# Patient Record
Sex: FEMALE | Race: WHITE | Employment: UNEMPLOYED | URBAN - METROPOLITAN AREA
[De-identification: names, ages, dates, MRNs, and addresses within clinical notes are randomized per-mention and may not be internally consistent; named-entity substitution may affect disease eponyms.]

---

## 2017-07-23 PROBLEM — Z80.49 FAMILY HISTORY OF CERVICAL CANCER: Status: ACTIVE | Noted: 2017-07-23

## 2018-04-30 ENCOUNTER — OFFICE VISIT (OUTPATIENT)
Dept: FAMILY MEDICINE CLINIC | Age: 58
End: 2018-04-30
Payer: MEDICAID

## 2018-04-30 VITALS
OXYGEN SATURATION: 98 % | SYSTOLIC BLOOD PRESSURE: 110 MMHG | TEMPERATURE: 97.6 F | WEIGHT: 239 LBS | DIASTOLIC BLOOD PRESSURE: 62 MMHG | BODY MASS INDEX: 53.76 KG/M2 | HEART RATE: 62 BPM | HEIGHT: 56 IN

## 2018-04-30 DIAGNOSIS — Z00.00 HEALTH CARE MAINTENANCE: ICD-10-CM

## 2018-04-30 DIAGNOSIS — E66.01 MORBID OBESITY WITH BMI OF 50.0-59.9, ADULT (HCC): Primary | ICD-10-CM

## 2018-04-30 DIAGNOSIS — B37.31 VAGINAL YEAST INFECTION: ICD-10-CM

## 2018-04-30 DIAGNOSIS — E03.9 ACQUIRED HYPOTHYROIDISM: ICD-10-CM

## 2018-04-30 DIAGNOSIS — I10 ESSENTIAL HYPERTENSION: ICD-10-CM

## 2018-04-30 DIAGNOSIS — E78.2 MIXED HYPERLIPIDEMIA: ICD-10-CM

## 2018-04-30 DIAGNOSIS — Z12.11 COLON CANCER SCREENING: ICD-10-CM

## 2018-04-30 DIAGNOSIS — E55.9 VITAMIN D DEFICIENCY: ICD-10-CM

## 2018-04-30 LAB — HBA1C MFR BLD: 5.1 %

## 2018-04-30 PROCEDURE — G8427 DOCREV CUR MEDS BY ELIG CLIN: HCPCS | Performed by: FAMILY MEDICINE

## 2018-04-30 PROCEDURE — 1036F TOBACCO NON-USER: CPT | Performed by: FAMILY MEDICINE

## 2018-04-30 PROCEDURE — G8417 CALC BMI ABV UP PARAM F/U: HCPCS | Performed by: FAMILY MEDICINE

## 2018-04-30 PROCEDURE — 3014F SCREEN MAMMO DOC REV: CPT | Performed by: FAMILY MEDICINE

## 2018-04-30 PROCEDURE — 83036 HEMOGLOBIN GLYCOSYLATED A1C: CPT | Performed by: FAMILY MEDICINE

## 2018-04-30 PROCEDURE — 3017F COLORECTAL CA SCREEN DOC REV: CPT | Performed by: FAMILY MEDICINE

## 2018-04-30 PROCEDURE — 99214 OFFICE O/P EST MOD 30 MIN: CPT | Performed by: FAMILY MEDICINE

## 2018-04-30 RX ORDER — SIMVASTATIN 40 MG
TABLET ORAL
Qty: 30 TABLET | Refills: 5
Start: 2018-04-30 | End: 2018-07-20 | Stop reason: SDUPTHER

## 2018-04-30 RX ORDER — LEVOTHYROXINE SODIUM 0.07 MG/1
TABLET ORAL
Qty: 30 TABLET | Refills: 5
Start: 2018-04-30 | End: 2018-07-20 | Stop reason: SDUPTHER

## 2018-04-30 RX ORDER — ERGOCALCIFEROL 1.25 MG/1
50000 CAPSULE ORAL
Qty: 24 CAPSULE | Refills: 1
Start: 2018-04-30 | End: 2018-07-20 | Stop reason: SDUPTHER

## 2018-04-30 ASSESSMENT — ENCOUNTER SYMPTOMS
HEARTBURN: 0
BACK PAIN: 1
BLURRED VISION: 0
DOUBLE VISION: 0
VOMITING: 0
WHEEZING: 0
ORTHOPNEA: 0
BLOOD IN STOOL: 0
DIARRHEA: 0
ABDOMINAL PAIN: 0
COUGH: 0
SORE THROAT: 0
SHORTNESS OF BREATH: 0
SPUTUM PRODUCTION: 0
NAUSEA: 0
CONSTIPATION: 0

## 2018-05-30 PROBLEM — Z00.00 HEALTH CARE MAINTENANCE: Status: RESOLVED | Noted: 2018-04-30 | Resolved: 2018-05-30

## 2018-07-20 ASSESSMENT — ENCOUNTER SYMPTOMS
CONSTIPATION: 0
BLURRED VISION: 0
BACK PAIN: 1
WHEEZING: 0
VOMITING: 0
DIARRHEA: 0
BLOOD IN STOOL: 0
COUGH: 0
SORE THROAT: 0
HEARTBURN: 0
ABDOMINAL PAIN: 0
NAUSEA: 0
ORTHOPNEA: 0
SPUTUM PRODUCTION: 0
DOUBLE VISION: 0
SHORTNESS OF BREATH: 0

## 2018-07-21 NOTE — PROGRESS NOTES
Kwame Olvera is a 56 y.o. female who presents today for check up and medication refills. Her chief complaint is confusion with her medications    She is requesting a 90 day supply for all of her medications. She has medications delivered to her from [facility] in [facility]. Her medications include Abilify and bupropion (prescribed by Morris Koyanagi, ANNA @[facility] for major depression); metoprolol tartrate 25 mg BID for HTN; levothyroxine 75 mcg/day for hypothyroidism; simvastatin 40 mg/day for hyperlipidemia; and Vitamin D 49282 IU/week for Vitamin D deficiency. She is followed by [facility] for \"female problem\"      Morbid Obesity:  She continues to lose weight. She reports that her appetite is markedly decreased and she is not eating as much as she used to. She also reports that she drinks far less soda pop that she had      Health Maintenance:  Kwame Olvera is due for screenings for cervical and colon cancer, HIV and Hepatitis C; she is also due for shingles and Tetanus vaccinations. Agreeable to colon cancer screening via FIT/FOBT. [address]:  Patient's past medical, surgical, social and/or family history reviewed, updated in chart, and are non-contributory (unless otherwise stated). Medications and allergies also reviewed and updated in chart. Review of Systems  Review of Systems   Constitutional: Negative for malaise/fatigue and weight loss. Morbid obesity; weight loss is occurring   HENT: Negative for congestion, ear pain and sore throat. Eyes: Negative for blurred vision and double vision. Respiratory: Negative for cough, sputum production, shortness of breath and wheezing. Cardiovascular: Negative for chest pain, palpitations, orthopnea and leg swelling. Gastrointestinal: Negative for abdominal pain, blood in stool, constipation, diarrhea, heartburn, nausea and vomiting. Genitourinary: Positive for frequency.  Negative for dysuria, hematuria and Twice a Week    Bronchospasm  -     albuterol sulfate HFA (VENTOLIN HFA) 108 (90 Base) MCG/ACT inhaler; INHALE 2 PUFFS EVERY 6 HOURS AS NEEDED FOR WHEEZING. Weight loss: While she reports less pop and less sugar, she still consumes. She has lost nearly 100#  -     XR CHEST STANDARD (2 VW); Future    Health care maintenance:  Due for the following:  -     PHOEBE DIGITAL SCREEN W CAD BILATERAL; Future    Colon cancer screening:  Patient strongly encouraged to complete her FIT kit. New one given to her    Breast cancer screening  -     PHOEBE DIGITAL SCREEN W CAD BILATERAL; Future    Other orders  -     Cancel: ARIPiprazole (ABILIFY) 5 MG tablet; Take 1 tablet by mouth daily        Call or go to ED immediately if symptoms worsen or persist.      Follow Up:  Return for F/U 3 months for check up., or sooner if necessary. Educational materials and/or home exercises printed for patient's review and were included in patient instructions on his/her After Visit Summary and given to patient at the end of visit. Counseled regarding above diagnosis, including possible risks and complications,  especially if left uncontrolled. Counseled regarding the possible side effects, risks, benefits and alternatives to treatment; patient and/or guardian verbalizes understanding, agrees, feels comfortable with and wishes to proceed with above treatment plan. Advised patient to call with any new medication issues, and read all Rx info from pharmacy to assure aware of all possible risks and side effects of medication before taking. Reviewed age and gender appropriate health screening exams and vaccinations. Advised patient regarding importance of keeping up with recommended health maintenance and to schedule as soon as possible if overdue, as this is important in assessing for undiagnosed pathology, especially cancer, as well as protecting against potentially harmful/life threatening disease.       Patient and/or guardian

## 2018-07-30 ENCOUNTER — HOSPITAL ENCOUNTER (OUTPATIENT)
Age: 58
Discharge: HOME | End: 2018-08-01
Payer: MEDICAID

## 2018-07-30 ENCOUNTER — OFFICE VISIT (OUTPATIENT)
Dept: FAMILY MEDICINE CLINIC | Age: 58
End: 2018-07-30
Payer: MEDICAID

## 2018-07-30 VITALS
OXYGEN SATURATION: 96 % | BODY MASS INDEX: 53.2 KG/M2 | DIASTOLIC BLOOD PRESSURE: 64 MMHG | SYSTOLIC BLOOD PRESSURE: 118 MMHG | HEIGHT: 56 IN | TEMPERATURE: 97.6 F | HEART RATE: 60 BPM | WEIGHT: 236.5 LBS

## 2018-07-30 DIAGNOSIS — E03.9 ACQUIRED HYPOTHYROIDISM: ICD-10-CM

## 2018-07-30 DIAGNOSIS — J98.01 BRONCHOSPASM: ICD-10-CM

## 2018-07-30 DIAGNOSIS — Z12.39 BREAST CANCER SCREENING: ICD-10-CM

## 2018-07-30 DIAGNOSIS — E55.9 VITAMIN D DEFICIENCY: ICD-10-CM

## 2018-07-30 DIAGNOSIS — I10 ESSENTIAL HYPERTENSION: ICD-10-CM

## 2018-07-30 DIAGNOSIS — R63.4 WEIGHT LOSS: ICD-10-CM

## 2018-07-30 DIAGNOSIS — E78.2 MIXED HYPERLIPIDEMIA: ICD-10-CM

## 2018-07-30 DIAGNOSIS — Z00.00 HEALTH CARE MAINTENANCE: ICD-10-CM

## 2018-07-30 DIAGNOSIS — I10 ESSENTIAL HYPERTENSION: Primary | ICD-10-CM

## 2018-07-30 DIAGNOSIS — Z12.11 COLON CANCER SCREENING: ICD-10-CM

## 2018-07-30 LAB
ALBUMIN SERPL-MCNC: 4 G/DL (ref 3.5–5.2)
ALP BLD-CCNC: 63 U/L (ref 35–104)
ALT SERPL-CCNC: 15 U/L (ref 0–32)
ANION GAP SERPL CALCULATED.3IONS-SCNC: 14 MMOL/L (ref 7–16)
AST SERPL-CCNC: 19 U/L (ref 0–31)
BASOPHILS ABSOLUTE: 0.02 E9/L (ref 0–0.2)
BASOPHILS RELATIVE PERCENT: 0.3 % (ref 0–2)
BILIRUB SERPL-MCNC: 1 MG/DL (ref 0–1.2)
BUN BLDV-MCNC: 15 MG/DL (ref 6–20)
CALCIUM SERPL-MCNC: 9.7 MG/DL (ref 8.6–10.2)
CHLORIDE BLD-SCNC: 101 MMOL/L (ref 98–107)
CHOLESTEROL, TOTAL: 170 MG/DL (ref 0–199)
CO2: 25 MMOL/L (ref 22–29)
CREAT SERPL-MCNC: 1.5 MG/DL (ref 0.5–1)
EOSINOPHILS ABSOLUTE: 0.06 E9/L (ref 0.05–0.5)
EOSINOPHILS RELATIVE PERCENT: 0.8 % (ref 0–6)
GFR AFRICAN AMERICAN: 43
GFR NON-AFRICAN AMERICAN: 36 ML/MIN/1.73
GLUCOSE BLD-MCNC: 95 MG/DL (ref 74–109)
HCT VFR BLD CALC: 47.3 % (ref 34–48)
HDLC SERPL-MCNC: 46 MG/DL
HEMOGLOBIN: 15 G/DL (ref 11.5–15.5)
IMMATURE GRANULOCYTES #: 0.02 E9/L
IMMATURE GRANULOCYTES %: 0.3 % (ref 0–5)
LDL CHOLESTEROL CALCULATED: 90 MG/DL (ref 0–99)
LYMPHOCYTES ABSOLUTE: 1.97 E9/L (ref 1.5–4)
LYMPHOCYTES RELATIVE PERCENT: 25.9 % (ref 20–42)
MAGNESIUM: 2.2 MG/DL (ref 1.6–2.6)
MCH RBC QN AUTO: 29.9 PG (ref 26–35)
MCHC RBC AUTO-ENTMCNC: 31.7 % (ref 32–34.5)
MCV RBC AUTO: 94.2 FL (ref 80–99.9)
MONOCYTES ABSOLUTE: 0.55 E9/L (ref 0.1–0.95)
MONOCYTES RELATIVE PERCENT: 7.2 % (ref 2–12)
NEUTROPHILS ABSOLUTE: 5 E9/L (ref 1.8–7.3)
NEUTROPHILS RELATIVE PERCENT: 65.5 % (ref 43–80)
PDW BLD-RTO: 13.2 FL (ref 11.5–15)
PLATELET # BLD: 216 E9/L (ref 130–450)
PMV BLD AUTO: 11.2 FL (ref 7–12)
POTASSIUM SERPL-SCNC: 4.6 MMOL/L (ref 3.5–5)
RBC # BLD: 5.02 E12/L (ref 3.5–5.5)
SODIUM BLD-SCNC: 140 MMOL/L (ref 132–146)
T4 TOTAL: 7.8 MCG/DL (ref 4.5–11.7)
TOTAL PROTEIN: 7.4 G/DL (ref 6.4–8.3)
TRIGL SERPL-MCNC: 168 MG/DL (ref 0–149)
TSH SERPL DL<=0.05 MIU/L-ACNC: 1.72 UIU/ML (ref 0.27–4.2)
VITAMIN D 25-HYDROXY: 89 NG/ML (ref 30–100)
VLDLC SERPL CALC-MCNC: 34 MG/DL
WBC # BLD: 7.6 E9/L (ref 4.5–11.5)

## 2018-07-30 PROCEDURE — 80061 LIPID PANEL: CPT

## 2018-07-30 PROCEDURE — 3017F COLORECTAL CA SCREEN DOC REV: CPT | Performed by: FAMILY MEDICINE

## 2018-07-30 PROCEDURE — 84436 ASSAY OF TOTAL THYROXINE: CPT

## 2018-07-30 PROCEDURE — 99214 OFFICE O/P EST MOD 30 MIN: CPT | Performed by: FAMILY MEDICINE

## 2018-07-30 PROCEDURE — 82306 VITAMIN D 25 HYDROXY: CPT

## 2018-07-30 PROCEDURE — G8427 DOCREV CUR MEDS BY ELIG CLIN: HCPCS | Performed by: FAMILY MEDICINE

## 2018-07-30 PROCEDURE — 83735 ASSAY OF MAGNESIUM: CPT

## 2018-07-30 PROCEDURE — 85025 COMPLETE CBC W/AUTO DIFF WBC: CPT

## 2018-07-30 PROCEDURE — 84443 ASSAY THYROID STIM HORMONE: CPT

## 2018-07-30 PROCEDURE — 80053 COMPREHEN METABOLIC PANEL: CPT

## 2018-07-30 PROCEDURE — 3014F SCREEN MAMMO DOC REV: CPT | Performed by: FAMILY MEDICINE

## 2018-07-30 PROCEDURE — 36415 COLL VENOUS BLD VENIPUNCTURE: CPT | Performed by: FAMILY MEDICINE

## 2018-07-30 PROCEDURE — G8417 CALC BMI ABV UP PARAM F/U: HCPCS | Performed by: FAMILY MEDICINE

## 2018-07-30 PROCEDURE — 1036F TOBACCO NON-USER: CPT | Performed by: FAMILY MEDICINE

## 2018-07-30 RX ORDER — SIMVASTATIN 40 MG
TABLET ORAL
Qty: 90 TABLET | Refills: 1 | Status: SHIPPED | OUTPATIENT
Start: 2018-07-30 | End: 2018-10-30 | Stop reason: SDUPTHER

## 2018-07-30 RX ORDER — ERGOCALCIFEROL 1.25 MG/1
50000 CAPSULE ORAL
Qty: 24 CAPSULE | Refills: 1 | Status: SHIPPED | OUTPATIENT
Start: 2018-07-30 | End: 2018-10-30 | Stop reason: DRUGHIGH

## 2018-07-30 RX ORDER — ARIPIPRAZOLE 5 MG/1
5 TABLET ORAL DAILY
Qty: 90 TABLET | Refills: 0 | Status: CANCELLED | OUTPATIENT
Start: 2018-07-30

## 2018-07-30 RX ORDER — ALBUTEROL SULFATE 90 UG/1
AEROSOL, METERED RESPIRATORY (INHALATION)
Qty: 18 G | Refills: 5 | Status: SHIPPED | OUTPATIENT
Start: 2018-07-30 | End: 2018-11-06 | Stop reason: SDUPTHER

## 2018-07-30 RX ORDER — LEVOTHYROXINE SODIUM 0.07 MG/1
TABLET ORAL
Qty: 90 TABLET | Refills: 1 | Status: SHIPPED | OUTPATIENT
Start: 2018-07-30 | End: 2018-10-30 | Stop reason: SDUPTHER

## 2018-10-03 ENCOUNTER — HOSPITAL ENCOUNTER (OUTPATIENT)
Dept: GENERAL RADIOLOGY | Age: 58
Discharge: HOME | End: 2018-10-05
Payer: MEDICAID

## 2018-10-03 DIAGNOSIS — Z12.39 BREAST CANCER SCREENING: ICD-10-CM

## 2018-10-03 DIAGNOSIS — Z00.00 HEALTH CARE MAINTENANCE: ICD-10-CM

## 2018-10-03 PROCEDURE — 77063 BREAST TOMOSYNTHESIS BI: CPT

## 2018-10-30 ENCOUNTER — OFFICE VISIT (OUTPATIENT)
Dept: FAMILY MEDICINE CLINIC | Age: 58
End: 2018-10-30
Payer: MEDICAID

## 2018-10-30 VITALS
WEIGHT: 242 LBS | SYSTOLIC BLOOD PRESSURE: 122 MMHG | BODY MASS INDEX: 54.44 KG/M2 | HEART RATE: 60 BPM | TEMPERATURE: 97.8 F | DIASTOLIC BLOOD PRESSURE: 82 MMHG | HEIGHT: 56 IN | OXYGEN SATURATION: 96 %

## 2018-10-30 DIAGNOSIS — Z12.11 COLON CANCER SCREENING: ICD-10-CM

## 2018-10-30 DIAGNOSIS — E78.2 MIXED HYPERLIPIDEMIA: ICD-10-CM

## 2018-10-30 DIAGNOSIS — E03.9 ACQUIRED HYPOTHYROIDISM: ICD-10-CM

## 2018-10-30 DIAGNOSIS — Z00.00 HEALTH CARE MAINTENANCE: ICD-10-CM

## 2018-10-30 DIAGNOSIS — Z13.31 POSITIVE DEPRESSION SCREENING: ICD-10-CM

## 2018-10-30 DIAGNOSIS — I10 ESSENTIAL HYPERTENSION: ICD-10-CM

## 2018-10-30 DIAGNOSIS — E55.9 VITAMIN D DEFICIENCY: ICD-10-CM

## 2018-10-30 DIAGNOSIS — F32.A DEPRESSION, UNSPECIFIED DEPRESSION TYPE: Primary | ICD-10-CM

## 2018-10-30 DIAGNOSIS — F41.9 ANXIETY: ICD-10-CM

## 2018-10-30 DIAGNOSIS — Z23 NEED FOR INFLUENZA VACCINATION: ICD-10-CM

## 2018-10-30 PROCEDURE — 1036F TOBACCO NON-USER: CPT | Performed by: FAMILY MEDICINE

## 2018-10-30 PROCEDURE — G8417 CALC BMI ABV UP PARAM F/U: HCPCS | Performed by: FAMILY MEDICINE

## 2018-10-30 PROCEDURE — 99214 OFFICE O/P EST MOD 30 MIN: CPT | Performed by: FAMILY MEDICINE

## 2018-10-30 PROCEDURE — 3014F SCREEN MAMMO DOC REV: CPT | Performed by: FAMILY MEDICINE

## 2018-10-30 PROCEDURE — 96160 PT-FOCUSED HLTH RISK ASSMT: CPT | Performed by: FAMILY MEDICINE

## 2018-10-30 PROCEDURE — G8427 DOCREV CUR MEDS BY ELIG CLIN: HCPCS | Performed by: FAMILY MEDICINE

## 2018-10-30 PROCEDURE — 3017F COLORECTAL CA SCREEN DOC REV: CPT | Performed by: FAMILY MEDICINE

## 2018-10-30 PROCEDURE — G8431 POS CLIN DEPRES SCRN F/U DOC: HCPCS | Performed by: FAMILY MEDICINE

## 2018-10-30 PROCEDURE — 90471 IMMUNIZATION ADMIN: CPT | Performed by: FAMILY MEDICINE

## 2018-10-30 PROCEDURE — 90686 IIV4 VACC NO PRSV 0.5 ML IM: CPT | Performed by: FAMILY MEDICINE

## 2018-10-30 PROCEDURE — G8482 FLU IMMUNIZE ORDER/ADMIN: HCPCS | Performed by: FAMILY MEDICINE

## 2018-10-30 RX ORDER — LEVOTHYROXINE SODIUM 0.07 MG/1
TABLET ORAL
Qty: 30 TABLET | Refills: 5 | Status: SHIPPED | OUTPATIENT
Start: 2018-10-30 | End: 2019-03-20 | Stop reason: SDUPTHER

## 2018-10-30 RX ORDER — ERGOCALCIFEROL 1.25 MG/1
50000 CAPSULE ORAL
Qty: 24 CAPSULE | Refills: 1 | Status: CANCELLED | OUTPATIENT
Start: 2018-11-01

## 2018-10-30 RX ORDER — SIMVASTATIN 40 MG
TABLET ORAL
Qty: 30 TABLET | Refills: 5 | Status: SHIPPED | OUTPATIENT
Start: 2018-10-30 | End: 2019-03-20 | Stop reason: SDUPTHER

## 2018-10-30 RX ORDER — ALBUTEROL SULFATE 90 UG/1
AEROSOL, METERED RESPIRATORY (INHALATION)
Qty: 18 G | Refills: 5 | Status: CANCELLED | OUTPATIENT
Start: 2018-10-30

## 2018-10-30 RX ORDER — BUPROPION HYDROCHLORIDE 300 MG/1
TABLET ORAL
Qty: 30 TABLET | Refills: 5
Start: 2018-10-30 | End: 2019-03-20

## 2018-10-30 ASSESSMENT — PATIENT HEALTH QUESTIONNAIRE - PHQ9
2. FEELING DOWN, DEPRESSED OR HOPELESS: 3
10. IF YOU CHECKED OFF ANY PROBLEMS, HOW DIFFICULT HAVE THESE PROBLEMS MADE IT FOR YOU TO DO YOUR WORK, TAKE CARE OF THINGS AT HOME, OR GET ALONG WITH OTHER PEOPLE: 1
5. POOR APPETITE OR OVEREATING: 1
4. FEELING TIRED OR HAVING LITTLE ENERGY: 1
1. LITTLE INTEREST OR PLEASURE IN DOING THINGS: 3
6. FEELING BAD ABOUT YOURSELF - OR THAT YOU ARE A FAILURE OR HAVE LET YOURSELF OR YOUR FAMILY DOWN: 1
7. TROUBLE CONCENTRATING ON THINGS, SUCH AS READING THE NEWSPAPER OR WATCHING TELEVISION: 0
DEPRESSION UNABLE TO ASSESS: FUNCTIONAL CAPACITY MOTIVATION LIMITS ACCURACY
SUM OF ALL RESPONSES TO PHQ QUESTIONS 1-9: 10
8. MOVING OR SPEAKING SO SLOWLY THAT OTHER PEOPLE COULD HAVE NOTICED. OR THE OPPOSITE, BEING SO FIGETY OR RESTLESS THAT YOU HAVE BEEN MOVING AROUND A LOT MORE THAN USUAL: 0
3. TROUBLE FALLING OR STAYING ASLEEP: 1
SUM OF ALL RESPONSES TO PHQ9 QUESTIONS 1 & 2: 6
9. THOUGHTS THAT YOU WOULD BE BETTER OFF DEAD, OR OF HURTING YOURSELF: 0
SUM OF ALL RESPONSES TO PHQ QUESTIONS 1-9: 10

## 2018-10-30 NOTE — PROGRESS NOTES
Shefali Choudhury is a 66 y.o. female who presents today for   Chief Complaint   Patient presents with    Follow-up     She has medications delivered to her from [address] [place] [address] in [facility]. Her medications include Abilify and bupropion (prescribed by Jeremy Mireles, ANNA @[facility] for major depression); metoprolol tartrate 25 mg BID for HTN; levothyroxine 75 mcg/day for hypothyroidism; simvastatin 40 mg/day for hyperlipidemia; and Vitamin D 21020 IU/week for Vitamin D deficiency. She is followed by [facility] for \"female problem\"    Last Vitamin D in 7/18 was 89. Vitamin D 54071 IU is discontinued and Vitamin D 1000 IU daily prescribed. Anxiety/Depression:  Longstanding. On the basis of positive PHQ-9 screening (PHQ-9 Total Score: 10), the following plan was implemented: patient is already under care of psychiatry and psychotherapy. On the basis of positive PHQ-9 screening (PHQ-9 Total Score: 10), the following plan was implemented: continue bupropion  mg/day, aripiprazole 5 mg daily. She reports that, while she does not have a good appetite, she tends to mostly eat sweets    Morbid Obesity:  Weight has stabilized since last visit. She reports that her appetite is markedly decreased and she is not eating as much as she used to. She also reports that she drinks far less soda pop that she had      Health Maintenance:  Shefali Choudhury is due for screenings for flu shot and colon cancer screening. She agrees to Flu shot. She found  Instructions to complete FOBT/FIT kit confusing; this will be reviewed with her by medical staff prior to close of today's office encounter. [address]:  Patient's past medical, surgical, social and/or family history reviewed, updated in chart, and are non-contributory (unless otherwise stated). Medications and allergies also reviewed and updated in chart.       Review of Systems  Review of Systems   Constitutional: Negative for malaise/fatigue and pulses. No murmur heard. Pulmonary/Chest: Effort normal. No stridor. No respiratory distress. She has no wheezes. She has no rales. She exhibits no tenderness. Abdominal: Soft. Bowel sounds are normal. She exhibits no distension and no mass. There is no tenderness. There is no guarding. Obesity   Musculoskeletal: Normal range of motion. She exhibits edema. She exhibits no tenderness. Lymphadenopathy:     She has no cervical adenopathy. Neurological: She is alert and oriented to person, place, and time. Skin: Skin is warm and dry. No rash noted. She is not diaphoretic. No erythema. No pallor. Psychiatric: She has a normal mood and affect. Her behavior is normal. Thought content normal.   Vitals reviewed.       Labs:  Lab Results   Component Value Date     07/30/2018    K 4.6 07/30/2018     07/30/2018    CO2 25 07/30/2018    BUN 15 07/30/2018    CREATININE 1.5 07/30/2018    PROT 7.4 07/30/2018    LABALBU 4.0 07/30/2018    CALCIUM 9.7 07/30/2018    GFRAA 43 07/30/2018    LABGLOM 36 07/30/2018    GLUCOSE 95 07/30/2018    AST 19 07/30/2018    ALT 15 07/30/2018    ALKPHOS 63 07/30/2018    BILITOT 1.0 07/30/2018    TSH 1.720 07/30/2018    CHOL 170 07/30/2018    TRIG 168 07/30/2018    HDL 46 07/30/2018    LDLCALC 90 07/30/2018    LABA1C 5.1 04/30/2018        Lab Results   Component Value Date    CHOL 170 07/30/2018    CHOL 154 09/28/2017    CHOL 139 03/22/2017     Lab Results   Component Value Date    TRIG 168 (H) 07/30/2018    TRIG 146 09/28/2017    TRIG 128 03/22/2017     Lab Results   Component Value Date    HDL 46 07/30/2018    HDL 47 09/28/2017    HDL 48 03/22/2017     Lab Results   Component Value Date    LDLCALC 90 07/30/2018    LDLCALC 78 09/28/2017    LDLCALC 65 03/22/2017       Lab Results   Component Value Date    LABA1C 5.1 04/30/2018     Lab Results   Component Value Date    LDLCALC 90 07/30/2018    CREATININE 1.5 (H) 07/30/2018         Assessment / Plan:      Jenifer Gibbs was seen today

## 2018-11-06 DIAGNOSIS — J98.01 BRONCHOSPASM: ICD-10-CM

## 2018-11-07 RX ORDER — ALBUTEROL SULFATE 90 UG/1
AEROSOL, METERED RESPIRATORY (INHALATION)
Qty: 18 G | Refills: 5 | Status: SHIPPED | OUTPATIENT
Start: 2018-11-07 | End: 2019-09-27 | Stop reason: SDUPTHER

## 2019-01-28 ENCOUNTER — NURSE ONLY (OUTPATIENT)
Dept: FAMILY MEDICINE CLINIC | Age: 59
End: 2019-01-28
Payer: MEDICAID

## 2019-01-28 ENCOUNTER — HOSPITAL ENCOUNTER (OUTPATIENT)
Age: 59
Discharge: HOME | End: 2019-01-30
Payer: MEDICAID

## 2019-01-28 DIAGNOSIS — I10 ESSENTIAL HYPERTENSION: ICD-10-CM

## 2019-01-28 DIAGNOSIS — E78.2 MIXED HYPERLIPIDEMIA: ICD-10-CM

## 2019-01-28 DIAGNOSIS — E03.9 ACQUIRED HYPOTHYROIDISM: ICD-10-CM

## 2019-01-28 DIAGNOSIS — E55.9 VITAMIN D DEFICIENCY: ICD-10-CM

## 2019-01-28 PROCEDURE — 85025 COMPLETE CBC W/AUTO DIFF WBC: CPT

## 2019-01-28 PROCEDURE — 83735 ASSAY OF MAGNESIUM: CPT

## 2019-01-28 PROCEDURE — 84443 ASSAY THYROID STIM HORMONE: CPT

## 2019-01-28 PROCEDURE — 80061 LIPID PANEL: CPT

## 2019-01-28 PROCEDURE — 80053 COMPREHEN METABOLIC PANEL: CPT

## 2019-01-28 PROCEDURE — 36415 COLL VENOUS BLD VENIPUNCTURE: CPT | Performed by: FAMILY MEDICINE

## 2019-01-28 PROCEDURE — 82306 VITAMIN D 25 HYDROXY: CPT

## 2019-01-29 LAB
ALBUMIN SERPL-MCNC: 4.1 G/DL (ref 3.5–5.2)
ALP BLD-CCNC: 62 U/L (ref 35–104)
ALT SERPL-CCNC: 14 U/L (ref 0–32)
ANION GAP SERPL CALCULATED.3IONS-SCNC: 16 MMOL/L (ref 7–16)
AST SERPL-CCNC: 14 U/L (ref 0–31)
BASOPHILS ABSOLUTE: 0.02 E9/L (ref 0–0.2)
BASOPHILS RELATIVE PERCENT: 0.3 % (ref 0–2)
BILIRUB SERPL-MCNC: 1 MG/DL (ref 0–1.2)
BUN BLDV-MCNC: 16 MG/DL (ref 6–20)
CALCIUM SERPL-MCNC: 9.2 MG/DL (ref 8.6–10.2)
CHLORIDE BLD-SCNC: 104 MMOL/L (ref 98–107)
CHOLESTEROL, TOTAL: 218 MG/DL (ref 0–199)
CO2: 22 MMOL/L (ref 22–29)
CREAT SERPL-MCNC: 1.4 MG/DL (ref 0.5–1)
EOSINOPHILS ABSOLUTE: 0.04 E9/L (ref 0.05–0.5)
EOSINOPHILS RELATIVE PERCENT: 0.6 % (ref 0–6)
GFR AFRICAN AMERICAN: 47
GFR NON-AFRICAN AMERICAN: 39 ML/MIN/1.73
GLUCOSE BLD-MCNC: 99 MG/DL (ref 74–99)
HCT VFR BLD CALC: 48.9 % (ref 34–48)
HDLC SERPL-MCNC: 50 MG/DL
HEMOGLOBIN: 15.3 G/DL (ref 11.5–15.5)
IMMATURE GRANULOCYTES #: 0.01 E9/L
IMMATURE GRANULOCYTES %: 0.1 % (ref 0–5)
LDL CHOLESTEROL CALCULATED: 137 MG/DL (ref 0–99)
LYMPHOCYTES ABSOLUTE: 2.01 E9/L (ref 1.5–4)
LYMPHOCYTES RELATIVE PERCENT: 29.1 % (ref 20–42)
MAGNESIUM: 2.2 MG/DL (ref 1.6–2.6)
MCH RBC QN AUTO: 30.3 PG (ref 26–35)
MCHC RBC AUTO-ENTMCNC: 31.3 % (ref 32–34.5)
MCV RBC AUTO: 96.8 FL (ref 80–99.9)
MONOCYTES ABSOLUTE: 0.51 E9/L (ref 0.1–0.95)
MONOCYTES RELATIVE PERCENT: 7.4 % (ref 2–12)
NEUTROPHILS ABSOLUTE: 4.31 E9/L (ref 1.8–7.3)
NEUTROPHILS RELATIVE PERCENT: 62.5 % (ref 43–80)
PDW BLD-RTO: 13.8 FL (ref 11.5–15)
PLATELET # BLD: 221 E9/L (ref 130–450)
PMV BLD AUTO: 11.4 FL (ref 7–12)
POTASSIUM SERPL-SCNC: 4.4 MMOL/L (ref 3.5–5)
RBC # BLD: 5.05 E12/L (ref 3.5–5.5)
SODIUM BLD-SCNC: 142 MMOL/L (ref 132–146)
TOTAL PROTEIN: 7.4 G/DL (ref 6.4–8.3)
TRIGL SERPL-MCNC: 156 MG/DL (ref 0–149)
TSH SERPL DL<=0.05 MIU/L-ACNC: 2.31 UIU/ML (ref 0.27–4.2)
VITAMIN D 25-HYDROXY: 48 NG/ML (ref 30–100)
VLDLC SERPL CALC-MCNC: 31 MG/DL
WBC # BLD: 6.9 E9/L (ref 4.5–11.5)

## 2019-03-20 ENCOUNTER — OFFICE VISIT (OUTPATIENT)
Dept: FAMILY MEDICINE CLINIC | Age: 59
End: 2019-03-20
Payer: MEDICAID

## 2019-03-20 VITALS
WEIGHT: 245.25 LBS | SYSTOLIC BLOOD PRESSURE: 116 MMHG | RESPIRATION RATE: 16 BRPM | OXYGEN SATURATION: 96 % | HEIGHT: 56 IN | DIASTOLIC BLOOD PRESSURE: 70 MMHG | BODY MASS INDEX: 55.17 KG/M2 | HEART RATE: 63 BPM | TEMPERATURE: 97.8 F

## 2019-03-20 DIAGNOSIS — E03.9 ACQUIRED HYPOTHYROIDISM: ICD-10-CM

## 2019-03-20 DIAGNOSIS — F32.A ANXIETY AND DEPRESSION: Primary | ICD-10-CM

## 2019-03-20 DIAGNOSIS — Z12.11 COLON CANCER SCREENING: ICD-10-CM

## 2019-03-20 DIAGNOSIS — I10 ESSENTIAL HYPERTENSION: ICD-10-CM

## 2019-03-20 DIAGNOSIS — E66.01 MORBID OBESITY WITH BMI OF 50.0-59.9, ADULT (HCC): ICD-10-CM

## 2019-03-20 DIAGNOSIS — F41.9 ANXIETY AND DEPRESSION: Primary | ICD-10-CM

## 2019-03-20 DIAGNOSIS — E78.2 MIXED HYPERLIPIDEMIA: ICD-10-CM

## 2019-03-20 DIAGNOSIS — E55.9 VITAMIN D DEFICIENCY: ICD-10-CM

## 2019-03-20 DIAGNOSIS — Z00.00 HEALTH CARE MAINTENANCE: ICD-10-CM

## 2019-03-20 PROCEDURE — G8417 CALC BMI ABV UP PARAM F/U: HCPCS | Performed by: FAMILY MEDICINE

## 2019-03-20 PROCEDURE — 1036F TOBACCO NON-USER: CPT | Performed by: FAMILY MEDICINE

## 2019-03-20 PROCEDURE — 3017F COLORECTAL CA SCREEN DOC REV: CPT | Performed by: FAMILY MEDICINE

## 2019-03-20 PROCEDURE — 96160 PT-FOCUSED HLTH RISK ASSMT: CPT | Performed by: FAMILY MEDICINE

## 2019-03-20 PROCEDURE — 99214 OFFICE O/P EST MOD 30 MIN: CPT | Performed by: FAMILY MEDICINE

## 2019-03-20 PROCEDURE — G8427 DOCREV CUR MEDS BY ELIG CLIN: HCPCS | Performed by: FAMILY MEDICINE

## 2019-03-20 PROCEDURE — G8482 FLU IMMUNIZE ORDER/ADMIN: HCPCS | Performed by: FAMILY MEDICINE

## 2019-03-20 RX ORDER — OXYBUTYNIN CHLORIDE 5 MG/1
5 TABLET, EXTENDED RELEASE ORAL DAILY
COMMUNITY
Start: 2019-03-06

## 2019-03-20 RX ORDER — ARIPIPRAZOLE 5 MG/1
5 TABLET ORAL DAILY
Qty: 30 TABLET | Refills: 5
Start: 2019-03-20

## 2019-03-20 RX ORDER — SIMVASTATIN 40 MG
TABLET ORAL
Qty: 30 TABLET | Refills: 5 | Status: SHIPPED | OUTPATIENT
Start: 2019-03-20 | End: 2019-10-26 | Stop reason: SDUPTHER

## 2019-03-20 RX ORDER — BUPROPION HYDROCHLORIDE 300 MG/1
TABLET ORAL
Qty: 30 TABLET | Refills: 5
Start: 2019-03-20

## 2019-03-20 RX ORDER — LEVOTHYROXINE SODIUM 0.07 MG/1
TABLET ORAL
Qty: 30 TABLET | Refills: 5 | Status: SHIPPED | OUTPATIENT
Start: 2019-03-20 | End: 2019-10-26 | Stop reason: SDUPTHER

## 2019-03-20 ASSESSMENT — PATIENT HEALTH QUESTIONNAIRE - PHQ9
9. THOUGHTS THAT YOU WOULD BE BETTER OFF DEAD, OR OF HURTING YOURSELF: 1
SUM OF ALL RESPONSES TO PHQ QUESTIONS 1-9: 12
4. FEELING TIRED OR HAVING LITTLE ENERGY: 3
8. MOVING OR SPEAKING SO SLOWLY THAT OTHER PEOPLE COULD HAVE NOTICED. OR THE OPPOSITE, BEING SO FIGETY OR RESTLESS THAT YOU HAVE BEEN MOVING AROUND A LOT MORE THAN USUAL: 0
5. POOR APPETITE OR OVEREATING: 1
SUM OF ALL RESPONSES TO PHQ QUESTIONS 1-9: 12
3. TROUBLE FALLING OR STAYING ASLEEP: 1
SUM OF ALL RESPONSES TO PHQ9 QUESTIONS 1 & 2: 5
1. LITTLE INTEREST OR PLEASURE IN DOING THINGS: 3
6. FEELING BAD ABOUT YOURSELF - OR THAT YOU ARE A FAILURE OR HAVE LET YOURSELF OR YOUR FAMILY DOWN: 1
10. IF YOU CHECKED OFF ANY PROBLEMS, HOW DIFFICULT HAVE THESE PROBLEMS MADE IT FOR YOU TO DO YOUR WORK, TAKE CARE OF THINGS AT HOME, OR GET ALONG WITH OTHER PEOPLE: 1
2. FEELING DOWN, DEPRESSED OR HOPELESS: 2

## 2019-09-27 DIAGNOSIS — J98.01 BRONCHOSPASM: ICD-10-CM

## 2019-09-27 RX ORDER — ALBUTEROL SULFATE 90 UG/1
AEROSOL, METERED RESPIRATORY (INHALATION)
Qty: 18 G | Refills: 5 | Status: SHIPPED
Start: 2019-09-27 | End: 2020-03-20

## 2019-11-06 DIAGNOSIS — E55.9 VITAMIN D DEFICIENCY: ICD-10-CM

## 2019-11-07 RX ORDER — MELATONIN
Qty: 30 TABLET | Refills: 11 | Status: SHIPPED
Start: 2019-11-07 | End: 2020-04-20 | Stop reason: SDUPTHER

## 2020-01-31 RX ORDER — LEVOTHYROXINE SODIUM 0.07 MG/1
TABLET ORAL
Qty: 30 TABLET | Refills: 2 | OUTPATIENT
Start: 2020-01-31

## 2020-01-31 RX ORDER — SIMVASTATIN 40 MG
TABLET ORAL
Qty: 30 TABLET | Refills: 2 | OUTPATIENT
Start: 2020-01-31

## 2020-02-03 ENCOUNTER — TELEPHONE (OUTPATIENT)
Dept: FAMILY MEDICINE CLINIC | Age: 60
End: 2020-02-03

## 2020-02-03 NOTE — TELEPHONE ENCOUNTER
I will prescribe a 30 day supply of this prescription. Patient needs to schedule an office visit prior to refills for this or any other medications    She needs to figure out how to get the bedbugs COMPLETELY TREATED  Before she comes to the office; she is correct that she cannot come in as long as she has an active infestation. ..

## 2020-02-03 NOTE — TELEPHONE ENCOUNTER
Renee Chaudhari called and stated she needs refills on all of her medications. She has not been seen since September 2019 she said she can't come in because she still has bedbugs.

## 2020-02-07 NOTE — TELEPHONE ENCOUNTER
Pt needs refill on Metoprolol  Last Appointment:  3/20/2019  No future appointments.    Called LM for pt to schedule appt

## 2020-03-11 ENCOUNTER — OFFICE VISIT (OUTPATIENT)
Dept: FAMILY MEDICINE CLINIC | Age: 60
End: 2020-03-11
Payer: MEDICAID

## 2020-03-11 VITALS
HEART RATE: 74 BPM | TEMPERATURE: 98 F | SYSTOLIC BLOOD PRESSURE: 124 MMHG | OXYGEN SATURATION: 96 % | BODY MASS INDEX: 57.14 KG/M2 | HEIGHT: 56 IN | RESPIRATION RATE: 16 BRPM | WEIGHT: 254 LBS | DIASTOLIC BLOOD PRESSURE: 80 MMHG

## 2020-03-11 PROCEDURE — 1036F TOBACCO NON-USER: CPT | Performed by: NURSE PRACTITIONER

## 2020-03-11 PROCEDURE — G8427 DOCREV CUR MEDS BY ELIG CLIN: HCPCS | Performed by: NURSE PRACTITIONER

## 2020-03-11 PROCEDURE — G8417 CALC BMI ABV UP PARAM F/U: HCPCS | Performed by: NURSE PRACTITIONER

## 2020-03-11 PROCEDURE — 99214 OFFICE O/P EST MOD 30 MIN: CPT | Performed by: NURSE PRACTITIONER

## 2020-03-11 PROCEDURE — G8484 FLU IMMUNIZE NO ADMIN: HCPCS | Performed by: NURSE PRACTITIONER

## 2020-03-11 PROCEDURE — 3017F COLORECTAL CA SCREEN DOC REV: CPT | Performed by: NURSE PRACTITIONER

## 2020-03-11 RX ORDER — SIMVASTATIN 40 MG
40 TABLET ORAL NIGHTLY
Qty: 30 TABLET | Refills: 2 | Status: SHIPPED
Start: 2020-03-11 | End: 2020-04-20 | Stop reason: SDUPTHER

## 2020-03-11 ASSESSMENT — ENCOUNTER SYMPTOMS
VOMITING: 0
WHEEZING: 0
CONSTIPATION: 0
NAUSEA: 0
COLOR CHANGE: 0
SHORTNESS OF BREATH: 0
COUGH: 0
EYE PAIN: 0
CHEST TIGHTNESS: 0
SINUS PAIN: 0
DIARRHEA: 0

## 2020-03-11 NOTE — PROGRESS NOTES
external ear normal.      Left Ear: Tympanic membrane, ear canal and external ear normal.      Nose: Nose normal.      Mouth/Throat:      Mouth: Mucous membranes are moist.      Pharynx: Oropharynx is clear. No oropharyngeal exudate. Eyes:      Extraocular Movements: Extraocular movements intact. Conjunctiva/sclera: Conjunctivae normal.      Pupils: Pupils are equal, round, and reactive to light. Neck:      Musculoskeletal: Normal range of motion and neck supple. Thyroid: No thyromegaly. Vascular: No carotid bruit. Cardiovascular:      Rate and Rhythm: Normal rate and regular rhythm. Pulses: Normal pulses. Heart sounds: Normal heart sounds. No murmur. Pulmonary:      Effort: Pulmonary effort is normal. No respiratory distress. Breath sounds: Normal breath sounds. Abdominal:      General: Bowel sounds are normal.      Palpations: Abdomen is soft. Tenderness: There is no abdominal tenderness. There is no guarding or rebound. Genitourinary:     Vagina: Normal.      Comments: Deferred. Musculoskeletal: Normal range of motion. Lymphadenopathy:      Cervical: No cervical adenopathy. Skin:     General: Skin is warm and dry. Capillary Refill: Capillary refill takes less than 2 seconds. Findings: No bruising, erythema or rash. Neurological:      General: No focal deficit present. Mental Status: She is alert and oriented to person, place, and time. Mental status is at baseline. Cranial Nerves: No cranial nerve deficit. Sensory: No sensory deficit. Motor: No weakness. Coordination: Coordination normal.      Gait: Gait normal.   Psychiatric:         Mood and Affect: Mood normal.         Behavior: Behavior normal.         Thought Content:  Thought content normal.         Judgment: Judgment normal.       Health Maintenance    BP Readings from Last 1 Encounters:   03/11/20 124/80    Recheck if >140/90  Hemoglobin A1C (%)   Date Value

## 2020-03-20 RX ORDER — ALBUTEROL SULFATE 90 UG/1
AEROSOL, METERED RESPIRATORY (INHALATION)
Qty: 18 G | Refills: 5 | Status: SHIPPED
Start: 2020-03-20 | End: 2021-03-08 | Stop reason: SDUPTHER

## 2020-03-25 PROBLEM — N20.0 KIDNEY STONES: Status: RESOLVED | Noted: 2020-03-25 | Resolved: 2020-03-24

## 2020-04-16 RX ORDER — LEVOTHYROXINE SODIUM 0.07 MG/1
TABLET ORAL
Qty: 30 TABLET | Refills: 1 | Status: CANCELLED | OUTPATIENT
Start: 2020-04-16

## 2020-04-16 NOTE — TELEPHONE ENCOUNTER
She needs an appointment.   Either a face to face or a Video appointment (preferably)    I will do a Virtual Visit per telephone call as a last resort

## 2020-04-20 NOTE — PROGRESS NOTES
Lester Garcia is a 64 y.o. female who presents today for     Chief Complaint   Patient presents with    Medication Refill     In need of refills        She has medications delivered to her from [address] [place] [address] in [facility]. Her medications include Abilify and bupropion (prescribed by  NP @[facility] for major depression); metoprolol tartrate 25 mg BID for HTN; levothyroxine 75 mcg/day for hypothyroidism; simvastatin 40 mg/day for hyperlipidemia; and Vitamin D 40836 IU/week for Vitamin D deficiency. She is followed by [facility] for \"female problem\". Issue with UI are improved with oxybutynin. Dr. Marcie Rowland needs to schedule patient for a D & C. She is reticent about moving forward with this procedure, despite the fact that her mother passed away in her late 62s from a \"female cancer\"      Anxiety/Depression:  Longstanding. On the basis of positive PHQ-9 screening (PHQ-9 Total Score: 13), the following plan was implemented: patient is already under care of psychiatry and psychotherapy. On the basis of positive PHQ-9 screening (PHQ-9 Total Score: 13),she is to continue bupropion  mg/day, aripiprazole 5 mg daily. She is followed by [facility] @ [facility]. Her counseling sessions are conducted over the phone as opposed to in office due to active bedbug infestation. She reports that she tends to mostly eat sweets. Weight is stable/decreasing. Morbid Obesity:    Wt Readings from Last 3 Encounters:   04/28/20 247 lb (112 kg)   03/11/20 254 lb (115.2 kg)   03/20/19 245 lb 4 oz (111.2 kg)     BMI Readings from Last 3 Encounters:   04/28/20 55.38 kg/m²   03/11/20 56.95 kg/m²   03/20/19 54.98 kg/m²     She has lost weight because she is not getting out to eat or go to the grocery store due to COVID-19 pandemic. In addition, she does report decreased appetite due to presence of depression    Hypertension:  Patient is here for follow up chronic hypertension.   This is importance of keeping up with recommended health maintenance and to schedule as soon as possible if overdue, as this is important in assessing for undiagnosed pathology, especially cancer, as well as protecting against potentially harmful/life threatening disease. Patient and/or guardian verbalizes understanding and agrees with above counseling, assessment and plan. All questions answered.     Sugey Schmidt MD     Patient will follow-up in 3 month(s) with psychiatrist.

## 2020-04-23 ENCOUNTER — HOSPITAL ENCOUNTER (OUTPATIENT)
Age: 60
Discharge: HOME | End: 2020-04-23
Payer: MEDICAID

## 2020-04-23 LAB
ALBUMIN SERPL-MCNC: 4.4 G/DL (ref 3.5–5.2)
ALP BLD-CCNC: 70 U/L (ref 35–104)
ALT SERPL-CCNC: 11 U/L (ref 0–32)
ANION GAP SERPL CALCULATED.3IONS-SCNC: 16 MMOL/L (ref 7–16)
AST SERPL-CCNC: 14 U/L (ref 0–31)
BASOPHILS ABSOLUTE: 0.04 E9/L (ref 0–0.2)
BASOPHILS RELATIVE PERCENT: 0.6 % (ref 0–2)
BILIRUB SERPL-MCNC: 0.8 MG/DL (ref 0–1.2)
BUN BLDV-MCNC: 20 MG/DL (ref 6–20)
CALCIUM SERPL-MCNC: 10 MG/DL (ref 8.6–10.2)
CHLORIDE BLD-SCNC: 100 MMOL/L (ref 98–107)
CO2: 23 MMOL/L (ref 22–29)
CREAT SERPL-MCNC: 1.5 MG/DL (ref 0.5–1)
EOSINOPHILS ABSOLUTE: 0.07 E9/L (ref 0.05–0.5)
EOSINOPHILS RELATIVE PERCENT: 1.1 % (ref 0–6)
GFR AFRICAN AMERICAN: 43
GFR NON-AFRICAN AMERICAN: 36 ML/MIN/1.73
GLUCOSE BLD-MCNC: 106 MG/DL (ref 74–99)
HCT VFR BLD CALC: 47.1 % (ref 34–48)
HEMOGLOBIN: 15 G/DL (ref 11.5–15.5)
IMMATURE GRANULOCYTES #: 0.02 E9/L
IMMATURE GRANULOCYTES %: 0.3 % (ref 0–5)
LYMPHOCYTES ABSOLUTE: 1.76 E9/L (ref 1.5–4)
LYMPHOCYTES RELATIVE PERCENT: 26.5 % (ref 20–42)
MCH RBC QN AUTO: 30.1 PG (ref 26–35)
MCHC RBC AUTO-ENTMCNC: 31.8 % (ref 32–34.5)
MCV RBC AUTO: 94.4 FL (ref 80–99.9)
MONOCYTES ABSOLUTE: 0.48 E9/L (ref 0.1–0.95)
MONOCYTES RELATIVE PERCENT: 7.2 % (ref 2–12)
NEUTROPHILS ABSOLUTE: 4.28 E9/L (ref 1.8–7.3)
NEUTROPHILS RELATIVE PERCENT: 64.3 % (ref 43–80)
PARATHYROID HORMONE INTACT: 28 PG/ML (ref 15–65)
PDW BLD-RTO: 13.4 FL (ref 11.5–15)
PHOSPHORUS: 3.5 MG/DL (ref 2.5–4.5)
PLATELET # BLD: 205 E9/L (ref 130–450)
PMV BLD AUTO: 10.7 FL (ref 7–12)
POTASSIUM SERPL-SCNC: 4.8 MMOL/L (ref 3.5–5)
RBC # BLD: 4.99 E12/L (ref 3.5–5.5)
SODIUM BLD-SCNC: 139 MMOL/L (ref 132–146)
TOTAL PROTEIN: 7.7 G/DL (ref 6.4–8.3)
WBC # BLD: 6.7 E9/L (ref 4.5–11.5)

## 2020-04-23 PROCEDURE — 83970 ASSAY OF PARATHORMONE: CPT

## 2020-04-23 PROCEDURE — 36415 COLL VENOUS BLD VENIPUNCTURE: CPT

## 2020-04-23 PROCEDURE — 80053 COMPREHEN METABOLIC PANEL: CPT

## 2020-04-23 PROCEDURE — 84100 ASSAY OF PHOSPHORUS: CPT

## 2020-04-23 PROCEDURE — 85025 COMPLETE CBC W/AUTO DIFF WBC: CPT

## 2020-04-28 ENCOUNTER — HOSPITAL ENCOUNTER (OUTPATIENT)
Age: 60
Discharge: HOME | End: 2020-04-30
Payer: MEDICAID

## 2020-04-28 ENCOUNTER — OFFICE VISIT (OUTPATIENT)
Dept: FAMILY MEDICINE CLINIC | Age: 60
End: 2020-04-28
Payer: MEDICAID

## 2020-04-28 VITALS
HEIGHT: 56 IN | RESPIRATION RATE: 24 BRPM | BODY MASS INDEX: 55.56 KG/M2 | SYSTOLIC BLOOD PRESSURE: 116 MMHG | WEIGHT: 247 LBS | OXYGEN SATURATION: 98 % | DIASTOLIC BLOOD PRESSURE: 77 MMHG | TEMPERATURE: 97.7 F | HEART RATE: 73 BPM

## 2020-04-28 PROBLEM — Z76.0 ENCOUNTER FOR MEDICATION REFILL: Status: ACTIVE | Noted: 2020-04-28

## 2020-04-28 LAB
CHOLESTEROL, TOTAL: 216 MG/DL (ref 0–199)
HDLC SERPL-MCNC: 50 MG/DL
LDL CHOLESTEROL CALCULATED: 140 MG/DL (ref 0–99)
T4 TOTAL: 7.6 MCG/DL (ref 4.5–11.7)
TRIGL SERPL-MCNC: 132 MG/DL (ref 0–149)
TSH SERPL DL<=0.05 MIU/L-ACNC: 2.21 UIU/ML (ref 0.27–4.2)
VLDLC SERPL CALC-MCNC: 26 MG/DL

## 2020-04-28 PROCEDURE — 84436 ASSAY OF TOTAL THYROXINE: CPT

## 2020-04-28 PROCEDURE — 84443 ASSAY THYROID STIM HORMONE: CPT

## 2020-04-28 PROCEDURE — 80061 LIPID PANEL: CPT

## 2020-04-28 PROCEDURE — 3017F COLORECTAL CA SCREEN DOC REV: CPT | Performed by: FAMILY MEDICINE

## 2020-04-28 PROCEDURE — G8417 CALC BMI ABV UP PARAM F/U: HCPCS | Performed by: FAMILY MEDICINE

## 2020-04-28 PROCEDURE — G8427 DOCREV CUR MEDS BY ELIG CLIN: HCPCS | Performed by: FAMILY MEDICINE

## 2020-04-28 PROCEDURE — 1036F TOBACCO NON-USER: CPT | Performed by: FAMILY MEDICINE

## 2020-04-28 PROCEDURE — 36415 COLL VENOUS BLD VENIPUNCTURE: CPT

## 2020-04-28 PROCEDURE — 99214 OFFICE O/P EST MOD 30 MIN: CPT | Performed by: FAMILY MEDICINE

## 2020-04-28 RX ORDER — SIMVASTATIN 40 MG
40 TABLET ORAL NIGHTLY
Qty: 30 TABLET | Refills: 11 | Status: SHIPPED
Start: 2020-04-28 | End: 2020-07-24 | Stop reason: SDUPTHER

## 2020-04-28 RX ORDER — LEVOTHYROXINE SODIUM 0.07 MG/1
75 TABLET ORAL DAILY
Qty: 30 TABLET | Refills: 11 | Status: SHIPPED
Start: 2020-04-28 | End: 2020-07-24 | Stop reason: SDUPTHER

## 2020-04-28 ASSESSMENT — PATIENT HEALTH QUESTIONNAIRE - PHQ9
4. FEELING TIRED OR HAVING LITTLE ENERGY: 2
1. LITTLE INTEREST OR PLEASURE IN DOING THINGS: 2
SUM OF ALL RESPONSES TO PHQ QUESTIONS 1-9: 13
10. IF YOU CHECKED OFF ANY PROBLEMS, HOW DIFFICULT HAVE THESE PROBLEMS MADE IT FOR YOU TO DO YOUR WORK, TAKE CARE OF THINGS AT HOME, OR GET ALONG WITH OTHER PEOPLE: 2
6. FEELING BAD ABOUT YOURSELF - OR THAT YOU ARE A FAILURE OR HAVE LET YOURSELF OR YOUR FAMILY DOWN: 2
7. TROUBLE CONCENTRATING ON THINGS, SUCH AS READING THE NEWSPAPER OR WATCHING TELEVISION: 1
3. TROUBLE FALLING OR STAYING ASLEEP: 0
8. MOVING OR SPEAKING SO SLOWLY THAT OTHER PEOPLE COULD HAVE NOTICED. OR THE OPPOSITE, BEING SO FIGETY OR RESTLESS THAT YOU HAVE BEEN MOVING AROUND A LOT MORE THAN USUAL: 1
9. THOUGHTS THAT YOU WOULD BE BETTER OFF DEAD, OR OF HURTING YOURSELF: 1
SUM OF ALL RESPONSES TO PHQ9 QUESTIONS 1 & 2: 4
SUM OF ALL RESPONSES TO PHQ QUESTIONS 1-9: 13
2. FEELING DOWN, DEPRESSED OR HOPELESS: 2
5. POOR APPETITE OR OVEREATING: 2

## 2020-04-28 NOTE — PATIENT INSTRUCTIONS
Patient Education        Bedbugs: Care Instructions  Your Care Instructions    Bedbugs are tiny bugs that feed on blood from animals or people. They have that name because they like to hide in bedding and mattresses. Bedbugs are not known to spread disease to people. But itching from the bites can be so bad that you may scratch hard enough to break the skin. That can lead to infection. The bites can also cause an allergic reaction in some people. The bugs can spread into cracks and crevices in the room and lay their eggs in anything that is in the room, including clothing and furniture. This makes them very hard to kill. Getting rid of bedbugs  The best way to get rid of bedbugs is to call a professional pest control company. They use special pesticides and other equipment to kill the bugs and their eggs. If you decide to buy your own pesticide, check the label. Make sure it says that it is for bedbugs. Be sure to follow the directions carefully. You may have to use the pesticide more than once. Do other cleaning steps such as:  · Vacuum often. Be sure to empty the vacuum after each use. If you use a vacuum bag, seal it and throw it out in an outdoor trash can. If you don't use a vacuum bag, empty the container and clean it with hot, soapy water. · Launder things that might hide bugs. Washing and then drying items in a dryer on a hot setting is adequate to kill bedbugs in clothing or linens. Turn the dryer to the hottest setting that the fabric can handle. · Use mattress, box spring, and pillow (encasement) sacks to trap bed bugs and help get rid of them. Be sure to follow the directions on the package. After your mattress has been cleared of bedbugs, you can buy a special mattress cover that is made to keep bedbugs out of your mattress. Follow-up care is a key part of your treatment and safety. Be sure to make and go to all appointments, and call your doctor if you are having problems.  It's also a good

## 2020-07-07 ENCOUNTER — OFFICE VISIT (OUTPATIENT)
Dept: FAMILY MEDICINE CLINIC | Age: 60
End: 2020-07-07
Payer: MEDICAID

## 2020-07-07 VITALS
HEIGHT: 56 IN | HEART RATE: 68 BPM | BODY MASS INDEX: 56.69 KG/M2 | RESPIRATION RATE: 16 BRPM | WEIGHT: 252 LBS | OXYGEN SATURATION: 98 %

## 2020-07-07 PROBLEM — R46.0 POOR PERSONAL HYGIENE: Status: ACTIVE | Noted: 2020-07-07

## 2020-07-07 PROCEDURE — G8427 DOCREV CUR MEDS BY ELIG CLIN: HCPCS | Performed by: FAMILY MEDICINE

## 2020-07-07 PROCEDURE — 3017F COLORECTAL CA SCREEN DOC REV: CPT | Performed by: FAMILY MEDICINE

## 2020-07-07 PROCEDURE — G8417 CALC BMI ABV UP PARAM F/U: HCPCS | Performed by: FAMILY MEDICINE

## 2020-07-07 PROCEDURE — 99215 OFFICE O/P EST HI 40 MIN: CPT | Performed by: FAMILY MEDICINE

## 2020-07-07 PROCEDURE — 1036F TOBACCO NON-USER: CPT | Performed by: FAMILY MEDICINE

## 2020-07-07 RX ORDER — MELATONIN
COMMUNITY
Start: 2020-05-01 | End: 2020-07-24 | Stop reason: SDUPTHER

## 2020-07-07 ASSESSMENT — ENCOUNTER SYMPTOMS
WHEEZING: 0
BLOOD IN STOOL: 0
ABDOMINAL PAIN: 0
DIARRHEA: 0
BACK PAIN: 0
SORE THROAT: 0
COUGH: 0
CONSTIPATION: 0
NAUSEA: 0
VOMITING: 0
SHORTNESS OF BREATH: 0

## 2020-07-07 ASSESSMENT — PATIENT HEALTH QUESTIONNAIRE - PHQ9
1. LITTLE INTEREST OR PLEASURE IN DOING THINGS: 0
SUM OF ALL RESPONSES TO PHQ QUESTIONS 1-9: 0
SUM OF ALL RESPONSES TO PHQ9 QUESTIONS 1 & 2: 0
SUM OF ALL RESPONSES TO PHQ QUESTIONS 1-9: 0
2. FEELING DOWN, DEPRESSED OR HOPELESS: 0

## 2020-07-07 NOTE — PROGRESS NOTES
Isa Beckman is a 64 y.o. female who presents today for     Chief Complaint   Patient presents with    Other     odor     Rash     concerned with skin        She is accompanied today by her  Kalpana Levin informed PCP that patient, who was previously living independently but in squalid conditions, was relocated to the [facility]. She had to leave all of her belongings behind because everything was bedbug infested. She endorses that patient now has to provide self care, including chores, her laundry, and daily bathing and grooming. She is now eligible for additional services, including meal delivery and home health aide. Despite this change, patient still is malodorous. She has a GYN history; she has urinary incontinence that is not well controlled with oxybutynin and she will not wear undergarment protection. She reports that she showers every day but her perineal care is \"so-so\". She was scheduled for D & C in 3/2020 but procedure was cancelled once COVID pandemic hit    Weight is stable. Denies significant fatigue. Appetite is good and she is eating 3 healthy, well balanced meals/day. Denies constipation or diarrhea. No skin/hair/nail texture changes. She is managed psychiatrically at [address]:  Patient's past medical, surgical, social and/or family history reviewed, updated in chart, and are non-contributory (unless otherwise stated). Medications and allergies also reviewed and updated in chart. Review of Systems  Review of Systems   HENT: Negative for congestion, ear pain and sore throat. Respiratory: Negative for cough, shortness of breath and wheezing. Cardiovascular: Negative for chest pain, palpitations and leg swelling. Gastrointestinal: Negative for abdominal pain, blood in stool, constipation, diarrhea, nausea and vomiting. Genitourinary: Negative for dysuria, frequency, hematuria and urgency.         Complains of itching in perineum and she does admit to scratching the area frequently   Musculoskeletal: Negative for back pain, myalgias and neck pain. Skin: Negative for rash. Neurological: Negative for dizziness, weakness and headaches. Psychiatric/Behavioral: The patient is not nervous/anxious. Physical Exam:    VS:  Pulse 68   Resp 16   Ht 4' 8\" (1.422 m)   Wt 252 lb (114.3 kg)   SpO2 98%   BMI 56.50 kg/m²     LAST WEIGHT:  Wt Readings from Last 3 Encounters:   07/07/20 252 lb (114.3 kg)   04/28/20 247 lb (112 kg)   03/11/20 254 lb (115.2 kg)       BMI Readings from Last 3 Encounters:   07/07/20 56.50 kg/m²   04/28/20 55.38 kg/m²   03/11/20 56.95 kg/m²       Physical Exam  Constitutional:       General: She is not in acute distress. Appearance: She is well-developed. She is not diaphoretic. HENT:      Head: Normocephalic and atraumatic. Right Ear: External ear normal.      Left Ear: External ear normal.      Mouth/Throat:      Pharynx: No oropharyngeal exudate. Eyes:      General: No scleral icterus. Right eye: No discharge. Conjunctiva/sclera: Conjunctivae normal.      Pupils: Pupils are equal, round, and reactive to light. Neck:      Musculoskeletal: Normal range of motion and neck supple. Thyroid: No thyromegaly. Cardiovascular:      Rate and Rhythm: Normal rate and regular rhythm. Heart sounds: Normal heart sounds. No murmur. Pulmonary:      Effort: Pulmonary effort is normal. No respiratory distress. Breath sounds: No stridor. No wheezing or rales. Chest:      Chest wall: No tenderness. Abdominal:      General: Bowel sounds are normal. There is no distension. Palpations: Abdomen is soft. There is no mass. Tenderness: There is no abdominal tenderness. There is no guarding. Genitourinary:     Exam position: Supine. Comments: Perineal are very malodorous.   Multiple excoriations of external vulva and labiae majora consistent with self inflicted itching and scratching    Large fecal streak noted on exam room table paper where she was seated  Musculoskeletal: Normal range of motion. General: No tenderness. Lymphadenopathy:      Cervical: No cervical adenopathy. Skin:     General: Skin is warm and dry. Coloration: Skin is not pale. Findings: No erythema or rash. Comments: Skin of bilateral breasts, axillae, and groin area remarkably clean and dry, free of rashes, lesions or odor   Neurological:      Mental Status: She is alert and oriented to person, place, and time. Psychiatric:         Behavior: Behavior normal.         Thought Content:  Thought content normal.         Labs:  Lab Results   Component Value Date     04/23/2020    K 4.8 04/23/2020     04/23/2020    CO2 23 04/23/2020    BUN 20 04/23/2020    CREATININE 1.5 04/23/2020    PROT 7.7 04/23/2020    LABALBU 4.4 04/23/2020    CALCIUM 10.0 04/23/2020    GFRAA 43 04/23/2020    LABGLOM 36 04/23/2020    GLUCOSE 106 04/23/2020    AST 14 04/23/2020    ALT 11 04/23/2020    ALKPHOS 70 04/23/2020    BILITOT 0.8 04/23/2020    TSH 2.210 04/28/2020    CHOL 216 04/28/2020    TRIG 132 04/28/2020    HDL 50 04/28/2020    LDLCALC 140 04/28/2020    LABA1C 5.1 04/30/2018        Lab Results   Component Value Date    CHOL 216 (H) 04/28/2020    CHOL 218 (H) 01/28/2019    CHOL 170 07/30/2018     Lab Results   Component Value Date    TRIG 132 04/28/2020    TRIG 156 (H) 01/28/2019    TRIG 168 (H) 07/30/2018     Lab Results   Component Value Date    HDL 50 04/28/2020    HDL 50 01/28/2019    HDL 46 07/30/2018     Lab Results   Component Value Date    LDLCALC 140 (H) 04/28/2020    LDLCALC 137 (H) 01/28/2019    LDLCALC 90 07/30/2018       Lab Results   Component Value Date    LABA1C 5.1 04/30/2018     Lab Results   Component Value Date    LDLCALC 140 (H) 04/28/2020    CREATININE 1.5 (H) 04/23/2020           Assessment / Plan:      Caprice Rice was seen today for other and

## 2020-07-24 ENCOUNTER — TELEPHONE (OUTPATIENT)
Dept: FAMILY MEDICINE CLINIC | Age: 60
End: 2020-07-24

## 2020-07-24 RX ORDER — SIMVASTATIN 40 MG
40 TABLET ORAL NIGHTLY
Qty: 30 TABLET | Refills: 11 | Status: SHIPPED
Start: 2020-07-24 | End: 2021-06-13 | Stop reason: SDUPTHER

## 2020-07-24 RX ORDER — LEVOTHYROXINE SODIUM 0.07 MG/1
75 TABLET ORAL DAILY
Qty: 30 TABLET | Refills: 11 | Status: SHIPPED
Start: 2020-07-24 | End: 2021-06-13 | Stop reason: SDUPTHER

## 2020-07-24 RX ORDER — MELATONIN
1000 DAILY
Qty: 30 TABLET | Refills: 11 | Status: SHIPPED
Start: 2020-07-24 | End: 2021-06-13 | Stop reason: SDUPTHER

## 2020-07-24 NOTE — TELEPHONE ENCOUNTER
walked into the office and said they are changing her pharmacy to [facility] in [place] so they can do the pill packs. All meds need to have a new script sent over they can not be transferred from her old pharmacy.

## 2020-07-24 NOTE — TELEPHONE ENCOUNTER
[address] [facility] [address]

## 2020-08-17 ENCOUNTER — TELEPHONE (OUTPATIENT)
Dept: FAMILY MEDICINE CLINIC | Age: 60
End: 2020-08-17

## 2020-08-17 NOTE — TELEPHONE ENCOUNTER
Modesta  for pt called and states insurance does cover for adult diapers and chux pads (liners). Wanting to know if you could order some please.   Please advise

## 2020-09-18 NOTE — TELEPHONE ENCOUNTER
Jen Pimentel [facility], called for refill.       Last seen 4/28/2020  Next appt 9/21/2020  Josue Thompson

## 2020-09-19 RX ORDER — CHOLECALCIFEROL (VITAMIN D3) 125 MCG
500 CAPSULE ORAL DAILY
Qty: 30 TABLET | Refills: 5 | OUTPATIENT
Start: 2020-09-19

## 2020-10-04 ASSESSMENT — ENCOUNTER SYMPTOMS
COUGH: 0
VOMITING: 0
DIARRHEA: 0
NAUSEA: 0
SHORTNESS OF BREATH: 0
SORE THROAT: 0
CONSTIPATION: 0
WHEEZING: 0
ABDOMINAL PAIN: 0
BLOOD IN STOOL: 0
BACK PAIN: 0

## 2020-10-05 NOTE — PROGRESS NOTES
Phylicia Jerez is a 64 y.o. female who presents today for     Chief Complaint   Patient presents with    Hearing Loss     C/o hearing loss. Unsure if symptoms are due to anxiety or she has actual hearing loss. Symptoms started \" quite awhile\" ago. She is concerned that it may be more concentration and memory related. She describes her symptoms that she can't keep up with what is being spoken. She does report that she has been having trouble sleeping in group home environment. She denies depression. She reports anxiety oer upcomig move to independent living. No known FH of Alzhemier's or dementia. [address]:  Patient's past medical, surgical, social and/or family history reviewed, updated in chart, and are non-contributory (unless otherwise stated). Medications and allergies also reviewed and updated in chart. Review of Systems  Review of Systems   HENT: Positive for hearing loss. Negative for congestion, ear pain and sore throat. Respiratory: Negative for cough, shortness of breath and wheezing. Cardiovascular: Negative for chest pain, palpitations and leg swelling. Gastrointestinal: Negative for abdominal pain, blood in stool, constipation, diarrhea, nausea and vomiting. Genitourinary: Negative for dysuria, frequency, hematuria and urgency. Musculoskeletal: Negative for back pain, myalgias and neck pain. Skin: Negative for rash. Neurological: Negative for dizziness, weakness and headaches. Psychiatric/Behavioral: The patient is not nervous/anxious. Physical Exam:    VS:  There were no vitals taken for this visit. LAST WEIGHT:  Wt Readings from Last 3 Encounters:   07/07/20 252 lb (114.3 kg)   04/28/20 247 lb (112 kg)   03/11/20 254 lb (115.2 kg)       BMI Readings from Last 3 Encounters:   07/07/20 56.50 kg/m²   04/28/20 55.38 kg/m²   03/11/20 56.95 kg/m²       Physical Exam  Constitutional:       General: She is not in acute distress.      Appearance: She is well-developed. She is not diaphoretic. HENT:      Head: Normocephalic and atraumatic. Right Ear: External ear normal.      Left Ear: External ear normal.      Mouth/Throat:      Pharynx: No oropharyngeal exudate. Eyes:      General: No scleral icterus. Right eye: No discharge. Conjunctiva/sclera: Conjunctivae normal.      Pupils: Pupils are equal, round, and reactive to light. Neck:      Musculoskeletal: Normal range of motion and neck supple. Thyroid: No thyromegaly. Cardiovascular:      Rate and Rhythm: Normal rate and regular rhythm. Heart sounds: Normal heart sounds. No murmur. Pulmonary:      Effort: Pulmonary effort is normal. No respiratory distress. Breath sounds: No stridor. No wheezing or rales. Chest:      Chest wall: No tenderness. Abdominal:      General: Bowel sounds are normal. There is no distension. Palpations: Abdomen is soft. There is no mass. Tenderness: There is no abdominal tenderness. There is no guarding. Musculoskeletal: Normal range of motion. General: No tenderness. Lymphadenopathy:      Cervical: No cervical adenopathy. Skin:     General: Skin is warm and dry. Coloration: Skin is not pale. Findings: No erythema or rash. Neurological:      Mental Status: She is alert and oriented to person, place, and time. Psychiatric:         Behavior: Behavior normal.         Thought Content: Thought content normal.      Comments: MINI-MENTAL STATUS EXAM ([facility])    What is the Year? Season? Date? Day? Month?   (indicate # correct)        5    Where are we: State? County? Town? Place?  Floor? (indicate # correct)        5    Name 3 objects and have pt repeat.                (indicate # correct)        3    Serial 7's or spell \"world\" backwards.                 (indicate # correct)        5    Recall 3 objects                (indicate # correct)        3    Patient names a pencil & a watch (indicate # correct)        2    Read and obey \"Close your eyes\"                (indicate 1 if correct)     1    Copy intersecting pentagons                (indicate 1 if correct)     1    Write a sentence                (indicate 1 if correct)     1    Repeat \"no ifs, ands, or buts\"                (indicate 1 if correct)     0    Follow 3-stage command                (indicate # done correctly) 3                                            ------------  TOTAL SCORE   (max = 30)                 29      REFERENCE INFORMATION FOR THE CLINICIAN:    \"Low\" score    = 0-23  \"Normal\" score = 24-30         Labs:  Lab Results   Component Value Date     04/23/2020    K 4.8 04/23/2020     04/23/2020    CO2 23 04/23/2020    BUN 20 04/23/2020    CREATININE 1.5 04/23/2020    PROT 7.7 04/23/2020    LABALBU 4.4 04/23/2020    CALCIUM 10.0 04/23/2020    GFRAA 43 04/23/2020    LABGLOM 36 04/23/2020    GLUCOSE 106 04/23/2020    AST 14 04/23/2020    ALT 11 04/23/2020    ALKPHOS 70 04/23/2020    BILITOT 0.8 04/23/2020    TSH 2.210 04/28/2020    CHOL 216 04/28/2020    TRIG 132 04/28/2020    HDL 50 04/28/2020    LDLCALC 140 04/28/2020    LABA1C 5.1 04/30/2018        Lab Results   Component Value Date    CHOL 216 (H) 04/28/2020    CHOL 218 (H) 01/28/2019    CHOL 170 07/30/2018     Lab Results   Component Value Date    TRIG 132 04/28/2020    TRIG 156 (H) 01/28/2019    TRIG 168 (H) 07/30/2018     Lab Results   Component Value Date    HDL 50 04/28/2020    HDL 50 01/28/2019    HDL 46 07/30/2018     Lab Results   Component Value Date    LDLCALC 140 (H) 04/28/2020    LDLCALC 137 (H) 01/28/2019    LDLCALC 90 07/30/2018       Lab Results   Component Value Date    LABA1C 5.1 04/30/2018     Lab Results   Component Value Date    LDLCALC 140 (H) 04/28/2020    CREATININE 1.5 (H) 04/23/2020           Assessment / Plan:      Ever Espinoza was seen today for hearing loss.     Diagnoses and all orders for this visit:    Decreased hearing, unspecified

## 2020-10-06 ENCOUNTER — OFFICE VISIT (OUTPATIENT)
Dept: FAMILY MEDICINE CLINIC | Age: 60
End: 2020-10-06
Payer: MEDICAID

## 2020-10-06 VITALS
SYSTOLIC BLOOD PRESSURE: 128 MMHG | TEMPERATURE: 98.4 F | DIASTOLIC BLOOD PRESSURE: 76 MMHG | HEART RATE: 72 BPM | WEIGHT: 249 LBS | BODY MASS INDEX: 55.82 KG/M2 | OXYGEN SATURATION: 92 %

## 2020-10-06 PROCEDURE — G8427 DOCREV CUR MEDS BY ELIG CLIN: HCPCS | Performed by: FAMILY MEDICINE

## 2020-10-06 PROCEDURE — G8417 CALC BMI ABV UP PARAM F/U: HCPCS | Performed by: FAMILY MEDICINE

## 2020-10-06 PROCEDURE — G8484 FLU IMMUNIZE NO ADMIN: HCPCS | Performed by: FAMILY MEDICINE

## 2020-10-06 PROCEDURE — 99213 OFFICE O/P EST LOW 20 MIN: CPT | Performed by: FAMILY MEDICINE

## 2020-10-06 PROCEDURE — 3017F COLORECTAL CA SCREEN DOC REV: CPT | Performed by: FAMILY MEDICINE

## 2020-10-06 PROCEDURE — 1036F TOBACCO NON-USER: CPT | Performed by: FAMILY MEDICINE

## 2020-10-06 RX ORDER — MELOXICAM 15 MG/1
15 TABLET ORAL DAILY
COMMUNITY
Start: 2020-10-01 | End: 2021-06-15

## 2020-10-12 ENCOUNTER — TELEPHONE (OUTPATIENT)
Dept: FAMILY MEDICINE CLINIC | Age: 60
End: 2020-10-12

## 2020-10-12 RX ORDER — CYANOCOBALAMIN (VITAMIN B-12) 500 MCG
TABLET ORAL
Qty: 30 TABLET | Refills: 11 | Status: SHIPPED
Start: 2020-10-12 | End: 2021-06-15 | Stop reason: SDUPTHER

## 2020-11-10 ENCOUNTER — TELEPHONE (OUTPATIENT)
Dept: FAMILY MEDICINE CLINIC | Age: 60
End: 2020-11-10

## 2020-11-10 NOTE — TELEPHONE ENCOUNTER
Pt. Called need rx faxed to [facility] audiology   [phone]    Please put DX on rx    Last seen 10/6/2020  Next appt 3/8/2021

## 2020-12-20 ASSESSMENT — ENCOUNTER SYMPTOMS
ABDOMINAL PAIN: 0
SORE THROAT: 0
WHEEZING: 0
DIARRHEA: 0
NAUSEA: 0
BLOOD IN STOOL: 0
SHORTNESS OF BREATH: 0
COUGH: 0
VOMITING: 0
BACK PAIN: 0
CONSTIPATION: 0

## 2020-12-20 NOTE — PROGRESS NOTES
Constitutional:       General: She is not in acute distress. Appearance: She is well-developed. She is not diaphoretic. HENT:      Head: Normocephalic and atraumatic. Right Ear: External ear normal. Decreased hearing noted. Left Ear: External ear normal. Decreased hearing noted. Mouth/Throat:      Pharynx: No oropharyngeal exudate. Eyes:      General: No scleral icterus. Right eye: No discharge. Conjunctiva/sclera: Conjunctivae normal.      Pupils: Pupils are equal, round, and reactive to light. Neck:      Musculoskeletal: Normal range of motion and neck supple. Thyroid: No thyromegaly. Cardiovascular:      Rate and Rhythm: Normal rate and regular rhythm. Heart sounds: Normal heart sounds. No murmur. Pulmonary:      Effort: Pulmonary effort is normal. No respiratory distress. Breath sounds: No stridor. No wheezing or rales. Chest:      Chest wall: No tenderness. Abdominal:      General: Bowel sounds are normal. There is no distension. Palpations: Abdomen is soft. There is no mass. Tenderness: There is no abdominal tenderness. There is no guarding. Musculoskeletal: Normal range of motion. General: No tenderness. Lymphadenopathy:      Cervical: No cervical adenopathy. Skin:     General: Skin is warm and dry. Coloration: Skin is not pale. Findings: No erythema or rash. Neurological:      Mental Status: She is alert and oriented to person, place, and time. Psychiatric:         Behavior: Behavior normal.         Thought Content:  Thought content normal.         Labs:  Lab Results   Component Value Date     04/23/2020    K 4.8 04/23/2020     04/23/2020    CO2 23 04/23/2020    BUN 20 04/23/2020    CREATININE 1.5 04/23/2020    PROT 7.7 04/23/2020    LABALBU 4.4 04/23/2020    CALCIUM 10.0 04/23/2020    GFRAA 43 04/23/2020    LABGLOM 36 04/23/2020    GLUCOSE 106 04/23/2020    AST 14 04/23/2020    ALT 11 04/23/2020 alternatives to treatment; patient and/or guardian verbalizes understanding, agrees, feels comfortable with and wishes to proceed with above treatment plan. Advised patient tocall with any new medication issues, and read all Rx info from pharmacy to assureaware of all possible risks and side effects of medication before taking. Reviewed age and gender appropriate health screening exams and vaccinations. Advisedpatient regarding importance of keeping up with recommended health maintenance andto schedule as soon as possible if overdue, as this is important in assessing forundiagnosed pathology, especially cancer, as well as protecting against potentially harmful/life threatening disease. Patient and/or guardian verbalizes understandingand agrees with above counseling, assessment and plan. All questions answered.     Nati Eedn MD

## 2020-12-21 ENCOUNTER — OFFICE VISIT (OUTPATIENT)
Dept: FAMILY MEDICINE CLINIC | Age: 60
End: 2020-12-21
Payer: MEDICAID

## 2020-12-21 VITALS
WEIGHT: 242 LBS | OXYGEN SATURATION: 98 % | HEART RATE: 60 BPM | HEIGHT: 56 IN | TEMPERATURE: 96.6 F | SYSTOLIC BLOOD PRESSURE: 122 MMHG | DIASTOLIC BLOOD PRESSURE: 80 MMHG | BODY MASS INDEX: 54.44 KG/M2

## 2020-12-21 PROCEDURE — G8482 FLU IMMUNIZE ORDER/ADMIN: HCPCS | Performed by: FAMILY MEDICINE

## 2020-12-21 PROCEDURE — G8427 DOCREV CUR MEDS BY ELIG CLIN: HCPCS | Performed by: FAMILY MEDICINE

## 2020-12-21 PROCEDURE — G8417 CALC BMI ABV UP PARAM F/U: HCPCS | Performed by: FAMILY MEDICINE

## 2020-12-21 PROCEDURE — 3017F COLORECTAL CA SCREEN DOC REV: CPT | Performed by: FAMILY MEDICINE

## 2020-12-21 PROCEDURE — 99213 OFFICE O/P EST LOW 20 MIN: CPT | Performed by: FAMILY MEDICINE

## 2020-12-21 PROCEDURE — 90686 IIV4 VACC NO PRSV 0.5 ML IM: CPT | Performed by: FAMILY MEDICINE

## 2020-12-21 PROCEDURE — 1036F TOBACCO NON-USER: CPT | Performed by: FAMILY MEDICINE

## 2020-12-21 PROCEDURE — 90471 IMMUNIZATION ADMIN: CPT | Performed by: FAMILY MEDICINE

## 2020-12-21 SDOH — ECONOMIC STABILITY: TRANSPORTATION INSECURITY
IN THE PAST 12 MONTHS, HAS THE LACK OF TRANSPORTATION KEPT YOU FROM MEDICAL APPOINTMENTS OR FROM GETTING MEDICATIONS?: NOT ASKED

## 2020-12-21 SDOH — ECONOMIC STABILITY: FOOD INSECURITY: WITHIN THE PAST 12 MONTHS, THE FOOD YOU BOUGHT JUST DIDN'T LAST AND YOU DIDN'T HAVE MONEY TO GET MORE.: NEVER TRUE

## 2020-12-21 SDOH — ECONOMIC STABILITY: FOOD INSECURITY: WITHIN THE PAST 12 MONTHS, YOU WORRIED THAT YOUR FOOD WOULD RUN OUT BEFORE YOU GOT MONEY TO BUY MORE.: NEVER TRUE

## 2020-12-21 SDOH — ECONOMIC STABILITY: INCOME INSECURITY: HOW HARD IS IT FOR YOU TO PAY FOR THE VERY BASICS LIKE FOOD, HOUSING, MEDICAL CARE, AND HEATING?: NOT HARD AT ALL

## 2020-12-21 SDOH — ECONOMIC STABILITY: TRANSPORTATION INSECURITY
IN THE PAST 12 MONTHS, HAS LACK OF TRANSPORTATION KEPT YOU FROM MEETINGS, WORK, OR FROM GETTING THINGS NEEDED FOR DAILY LIVING?: NOT ASKED

## 2020-12-21 NOTE — PROGRESS NOTES
Vaccine Information Sheet, \"Influenza - Inactivated\"  given to [employer], or parent/legal guardian of  [employer] and verbalized understanding. Patient responses:    Have you ever had a reaction to a flu vaccine? No  Do you have any current illness? No  Have you ever had Guillian Azusa Syndrome? No  Do you have a serious allergy to any of the follow: Neomycin, Polymyxin, Thimerosal, eggs or egg products? No    Flu vaccine given per order. Please see immunization tab. Risks and benefits explained. Current VIS given.       Immunizations Administered     Name Date Dose Route    Influenza, Quadv, IM, PF (6 mo and older Fluzone, Flulaval, Fluarix, and 3 yrs and older Afluria) 12/21/2020 0.5 mL Intramuscular    Site: Deltoid- Left    Lot: [id number]    NDC: 30886-935-93

## 2020-12-21 NOTE — PATIENT INSTRUCTIONS
Patient Education        Sciatica: Care Instructions  Your Care Instructions     Sciatica (say \"cpc-QL-kr-kuh\") is an irritation of one of the sciatic nerves, which come from the spinal cord in the lower back. The sciatic nerves and their branches extend down through the buttock to the foot. Sciatica can develop when an injured disc in the back irritates or presses against a spinal nerve root. Its main symptom is pain, numbness, or weakness that is often worse in the leg or foot than in the back. Sciatica often will improve and go away with time. Early treatment usually includes medicines and exercises to relieve pain. Follow-up care is a key part of your treatment and safety. Be sure to make and go to all appointments, and call your doctor if you are having problems. It's also a good idea to know your test results and keep a list of the medicines you take. How can you care for yourself at home? · Take pain medicines exactly as directed. ? If the doctor gave you a prescription medicine for pain, take it as prescribed. ? If you are not taking a prescription pain medicine, ask your doctor if you can take an over-the-counter medicine. · Use heat or ice to relieve pain. ? To apply heat, put a warm water bottle, heating pad set on low, or warm cloth on your back. Do not go to sleep with a heating pad on your skin. ? To use ice, put ice or a cold pack on the area for 10 to 20 minutes at a time. Put a thin cloth between the ice and your skin. · Avoid sitting if possible, unless it feels better than standing. · Alternate lying down with short walks. Increase your walking distance as you are able to without making your symptoms worse. · Do not do anything that makes your symptoms worse. When should you call for help? Call 911 anytime you think you may need emergency care. For example, call if:    · You are unable to move a leg at all.    Call your doctor now or seek immediate medical care if:   · You have new or worse symptoms in your legs or buttocks. Symptoms may include:  ? Numbness or tingling. ? Weakness. ? Pain.     · You lose bladder or bowel control. Watch closely for changes in your health, and be sure to contact your doctor if:    · You are not getting better as expected. Where can you learn more? Go to https://chpepiceweb.AsesoriÂ­as Digitales (Digital Advisors). org and sign in to your Eloxx account. Enter [phone] in the [facility] box to learn more about \"Sciatica: Care Instructions. \"     If you do not have an account, please click on the \"Sign Up Now\" link. Current as of: March 2, 2020               Content Version: 12.6  © 2006-2020 [employer], [employer]GL. Care instructions adapted under license by [facility] ([facility]). If you have questions about a medical condition or this instruction, always ask your healthcare professional. [address] any warranty or liability for your use of this information. Patient Education        Sciatica: Exercises  Introduction  Here are some examples of typical rehabilitation exercises for your condition. Start each exercise slowly. Ease off the exercise if you start to have pain. Your doctor or physical therapist will tell you when you can start these exercises and which ones will work best for you. When you are not being active, find a comfortable position for rest. Some people are comfortable on the floor or a medium-firm bed with a small pillow under their head and another under their knees. Some people prefer to lie on their side with a pillow between their knees. Don't stay in one position for too long. Take short walks (10 to 20 minutes) every 2 to 3 hours. Avoid slopes, hills, and stairs until you feel better. Walk only distances you can manage without pain, especially leg pain. How to do the exercises  Back stretches   1. Get down on your hands and knees on the floor. 8. Press your elbows down into the floor to raise your upper back. As you do this, relax your stomach muscles and allow your back to arch without using your back muscles. As your press up, do not let your hips or pelvis come off the floor. 9. Hold for 15 to 30 seconds, then relax. 10. Repeat 2 to 4 times. Alternate arm and leg (bird dog) exercise   Do this exercise slowly. Try to keep your body straight at all times, and do not let one hip drop lower than the other. 1. Start on the floor, on your hands and knees. 2. Tighten your belly muscles. 3. Raise one leg off the floor, and hold it straight out behind you. Be careful not to let your hip drop down, because that will twist your trunk. 4. Hold for about 6 seconds, then lower your leg and switch to the other leg. 5. Repeat 8 to 12 times on each leg. 6. Over time, work up to holding for 10 to 30 seconds each time. 7. If you feel stable and secure with your leg raised, try raising the opposite arm straight out in front of you at the same time. Knee-to-chest exercise   1. Lie on your back with your knees bent and your feet flat on the floor. 2. Bring one knee to your chest, keeping the other foot flat on the floor (or keeping the other leg straight, whichever feels better on your lower back). 3. Keep your lower back pressed to the floor. Hold for at least 15 to 30 seconds. 4. Relax, and lower the knee to the starting position. 5. Repeat with the other leg. Repeat 2 to 4 times with each leg. 6. To get more stretch, put your other leg flat on the floor while pulling your knee to your chest.    Curl-ups   1. Lie on the floor on your back with your knees bent at a 90-degree angle. Your feet should be flat on the floor, about 12 inches from your buttocks. 2. Cross your arms over your chest. If this bothers your neck, try putting your hands behind your neck (not your head), with your elbows spread apart. 3. Slowly tighten your belly muscles and raise your shoulder blades off the floor. 4. Keep your head in line with your body, and do not press your chin to your chest.  5. Hold this position for 1 or 2 seconds, then slowly lower yourself back down to the floor. 6. Repeat 8 to 12 times. Pelvic tilt exercise   1. Lie on your back with your knees bent. 2. \"Brace\" your stomach. This means to tighten your muscles by pulling in and imagining your belly button moving toward your spine. You should feel like your back is pressing to the floor and your hips and pelvis are rocking back. 3. Hold for about 6 seconds while you breathe smoothly. 4. Repeat 8 to 12 times. Heel dig bridging   1. Lie on your back with both knees bent and your ankles bent so that only your heels are digging into the floor. Your knees should be bent about 90 degrees. 2. Then push your heels into the floor, squeeze your buttocks, and lift your hips off the floor until your shoulders, hips, and knees are all in a straight line. 3. Hold for about 6 seconds as you continue to breathe normally, and then slowly lower your hips back down to the floor and rest for up to 10 seconds. 4. Do 8 to 12 repetitions. Hamstring stretch in doorway   1. Lie on your back in a doorway, with one leg through the open door. 2. Slide your leg up the wall to straighten your knee. You should feel a gentle stretch down the back of your leg. 3. Hold the stretch for at least 15 to 30 seconds. Do not arch your back, point your toes, or bend either knee. Keep one heel touching the floor and the other heel touching the wall. 4. Repeat with your other leg. 5. Do 2 to 4 times for each leg. Hip flexor stretch   1. Kneel on the floor with one knee bent and one leg behind you. Place your forward knee over your foot. Keep your other knee touching the floor. 2. Slowly push your hips forward until you feel a stretch in the upper thigh of your rear leg. 3. Hold the stretch for at least 15 to 30 seconds. Repeat with your other leg. 4. Do 2 to 4 times on each side. Wall sit   1. Stand with your back 10 to 12 inches away from a wall. 2. Lean into the wall until your back is flat against it. 3. Slowly slide down until your knees are slightly bent, pressing your lower back into the wall. 4. Hold for about 6 seconds, then slide back up the wall. 5. Repeat 8 to 12 times. Follow-up care is a key part of your treatment and safety. Be sure to make and go to all appointments, and call your doctor if you are having problems. It's also a good idea to know your test results and keep a list of the medicines you take. Where can you learn more? Go to https://Aavya HealthpeSmartPay Jieyin.Aden & Anais. org and sign in to your Llesiant account. Enter J310 in the PriceTag box to learn more about \"Low Back Pain: Exercises. \"     If you do not have an account, please click on the \"Sign Up Now\" link. Current as of: March 2, 2020               Content Version: 12.6  © 2302-4644 BA Systems. Care instructions adapted under license by [facility] ([facility]). If you have questions about a medical condition or this instruction, always ask your healthcare professional. [place] [address] any warranty or liability for your use of this information. Patient Education         Proper Sitting and Lifting for a Healthy Back (01:01)  Your health professional recommends that you watch this short online health video. Learn how to sit and lift correctly to keep your back healthy. How to watch the video    Scan the QR code   OR Visit the website    https://hwi. se/[email or web address]   Current as of: March 2, 2020               Content Version: 12.6  © 0493-0263 Healthwise, Incorporated. Care instructions adapted under license by [facility] ([facility]). If you have questions about a medical condition or this instruction, always ask your healthcare professional. [employer] any warranty or liability for your use of this information. Patient Education         Back Pain: Strengthening Your Core (00:29)  Your health professional recommends that you watch this short online health video. Learn how being active builds a strong core and keeps your back healthy. How to watch the video    Scan the QR code   OR Visit the website    [email or web address] se/[email or web address]   Current as of: March 2, 2020               Content Version: 12.6  © 6660-7457 Vivolux. Care instructions adapted under license by [facility] ([facility]). If you have questions about a medical condition or this instruction, always ask your healthcare professional. [address] any warranty or liability for your use of this information. Patient Education        Learning About Relief for Back Pain  What is back strain? Back strain is an injury that happens when you overstretch, or pull, a muscle in your back. You may hurt your back in an accident or when you exercise or lift something. Most back pain gets better with rest and time. You can take care of yourself at home to help your back heal.  What can you do first to relieve back pain? When you first feel back pain, try these steps:  · Walk. Take a short walk (10 to 20 minutes) on a level surface (no slopes, hills, or stairs) every 2 to 3 hours. Walk only distances you can manage without pain, especially leg pain. · Relax. Find a comfortable position for rest. Some people are comfortable on the floor or a medium-firm bed with a small pillow under their head and another under their knees. Some people prefer to lie on their side with a pillow between their knees. Don't stay in one position for too long. · Try heat or ice. Try using a heating pad on a low or medium setting, or take a warm shower, for 15 to 20 minutes every 2 to 3 hours. Or you can buy single-use heat wraps that last up to 8 hours. You can also try an ice pack for 10 to 15 minutes every 2 to 3 hours. You can use an ice pack or a bag of frozen vegetables wrapped in a thin towel. There is not strong evidence that either heat or ice will help, but you can try them to see if they help. You may also want to try switching between heat and cold. · Take pain medicine exactly as directed. ? If the doctor gave you a prescription medicine for pain, take it as prescribed. ? If you are not taking a prescription pain medicine, ask your doctor if you can take an over-the-counter medicine. What else can you do? · Stretch and exercise. Exercises that increase flexibility may relieve your pain and make it easier for your muscles to keep your spine in a good, neutral position. And don't forget to keep walking. · Do self-massage. You can use self-massage to unwind after work or school or to energize yourself in the morning. You can easily massage your feet, hands, or neck. Self-massage works best if you are in comfortable clothes and are sitting or lying in a comfortable position. Use oil or lotion to massage bare skin. · Reduce stress. Back pain can lead to a vicious Te-Moak: Distress about the pain tenses the muscles in your back, which in turn causes more pain. Learn how to relax your mind and your muscles to lower your stress. Where can you learn more? Go to https://carmen.Diditz. org and sign in to your Sanovation account. Enter I738 in the e-Zassi box to learn more about \"Learning About Relief for Back Pain. \"     If you do not have an account, please click on the \"Sign Up Now\" link. Current as of: March 2, 2020               Content Version: 12.6  © 7046-4086 [employer], [employer]Jane Distribution. Care instructions adapted under license by [facility] ([facility]). If you have questions about a medical condition or this instruction, always ask your healthcare professional. [address] any warranty or liability for your use of this information.

## 2020-12-28 ENCOUNTER — TELEPHONE (OUTPATIENT)
Dept: FAMILY MEDICINE CLINIC | Age: 60
End: 2020-12-28

## 2020-12-28 RX ORDER — METHYLPREDNISOLONE 4 MG/1
TABLET ORAL
Qty: 1 KIT | Refills: 0 | Status: SHIPPED | OUTPATIENT
Start: 2020-12-28 | End: 2021-01-03

## 2020-12-28 NOTE — TELEPHONE ENCOUNTER
PT CALLED HAVING SCIATIC PAIN WOULD LIKE MEDROL DOSE PK    [facility]     Last seen 12/21/2020  Next appt 3/8/2021

## 2021-02-19 ENCOUNTER — FOLLOWUP TELEPHONE ENCOUNTER (OUTPATIENT)
Dept: AUDIOLOGY | Age: 61
End: 2021-02-19

## 2021-02-19 NOTE — TELEPHONE ENCOUNTER
Tried to leave voice message due to need to reschedule hearing test. No voicemail was set up.      Electronically signed by Kieran Stanford on 2/19/2021 at 12:10 PM

## 2021-02-22 ENCOUNTER — HOSPITAL ENCOUNTER (OUTPATIENT)
Dept: AUDIOLOGY | Age: 61
Discharge: HOME | End: 2021-02-22
Payer: MEDICAID

## 2021-02-22 PROCEDURE — 92567 TYMPANOMETRY: CPT | Performed by: AUDIOLOGIST

## 2021-02-22 PROCEDURE — 92557 COMPREHENSIVE HEARING TEST: CPT | Performed by: AUDIOLOGIST

## 2021-02-22 NOTE — PROGRESS NOTES
AUDIOMETRIC EVALUATION    REASON FOR REFERRAL:  This patient was referred for audiometric testing by Dr Emiliano Flores due to hearing and discrimination difficulties . RESULTS:  Pure tone audiometric testing using earphones was carried out. Results revealed air conduction thresholds averaging 45 dBHL through 2000 Hz sloping to an average of 75 dBHL at 8000 Hz. Speech reception thresholds were obtained at 40/45 dBHL . Speech discrimination testing was performed at 75 dBHL. Obtained were scores of 80%. (Un)Masked Bone Conduction Testing revealed thresholds equal to air conduction thresholds. Tympanometry was administered and revealed shallow tympanograms bilaterally. IMPRESSION:  Olivia Miller results revealed a mild sloping to severe sensorineural hearing loss. Speech reception thresholds were in agreement with the pure tone averages. Speech discrimination was good. Tympanometry was shallow. A re-evaluation is recommended if a change in hearing is noted. Prior authorization for amplification was discussed. The above results were reviewed with the patient. If I can be of further assistance or provide additional information, please do not hesitate to contact this office.       Thank you for the referral.        ___________________________________

## 2021-03-02 NOTE — PROGRESS NOTES
Bebeto Myles is a 64 y.o. female who presents today for     Chief Complaint   Patient presents with    Medication Refill    Lower Back Pain     sciatica       She has medications delivered to her from [facility] in [facility]. Her medications include Abilify and bupropion (prescribed by  NP @[facility] for major depression); metoprolol tartrate 25 mg BID for HTN; levothyroxine 75 mcg/day for hypothyroidism; simvastatin 40 mg/day for hyperlipidemia; and Vitamin D 86470 IU/week for Vitamin D deficiency. She is followed by [facility] for \"female problem\". Issue with UI are improved with oxybutynin. Dr. Corby Ruth needs to schedule patient for a D & C. She is reticent about moving forward with this procedure, despite the fact that her mother passed away in her late 62s from a \"female cancer\"      Anxiety/Depression:  Longstanding. Today's PHQ-2=1, so her symptoms are stable and well controlled today. Continue bupropion  mg/day, aripiprazole 5 mg daily. She is followed by [facility] @ [facility], [place], [facility]. Her counseling sessions are conducted over the phone as opposed to in office due to active bedbug infestation. She reports that she tends to mostly eat sweets. Weight is stable/decreasing. Morbid Obesity:    Wt Readings from Last 3 Encounters:   03/08/21 257 lb 12.8 oz (116.9 kg)   12/21/20 242 lb (109.8 kg)   10/06/20 249 lb (112.9 kg)     BMI Readings from Last 3 Encounters:   03/08/21 57.80 kg/m²   12/21/20 54.26 kg/m²   10/06/20 55.82 kg/m²     She has gained 15# due to eating ice cream regularly. She is getting groceries via [email or web address] (delivery service); she orders ice cream each time. Historically, when she limits her portions and the consumption of nutrient poor foods, she loses weight very easily. She is counseled to make better choices when shopping and eating. .. Hypertension:  Patient is here for follow up chronic hypertension.   This is  generally controlled on current medication regimen (Metoprolol tartrate 25 mg BID). Patient requests reduction of metoprolol dosage to 1 time a day. BP today is 98/62. Takes meds as directed and tolerates them well. Most recent labs reviewed with patient and are not remarkable other than moderately elevated LDL, due to weight gain. No symptoms from htn standpoint per ROS. Patient is not compliant with lifestyle modifications. Patient does not smoke. Comorbid conditions include hyperlipidemia and hypothyroidism. Lab Results   Component Value Date    CHOL 216 (H) 04/28/2020    CHOL 218 (H) 01/28/2019    CHOL 170 07/30/2018     Lab Results   Component Value Date    TRIG 132 04/28/2020    TRIG 156 (H) 01/28/2019    TRIG 168 (H) 07/30/2018     Lab Results   Component Value Date    HDL 50 04/28/2020    HDL 50 01/28/2019    HDL 46 07/30/2018     Lab Results   Component Value Date    LDLCALC 140 (H) 04/28/2020    LDLCALC 137 (H) 01/28/2019    [address] 90 07/30/2018       Health Maintenance:  Russ Cary is due for breast and colon cancer screening. She remains reticent about completing this. [address] [facility]:  Patient's past medical, surgical, social and/or family history reviewed, updated in chart, and are non-contributory (unless otherwise stated). Medications and allergies also reviewed and updated in chart. Review of Systems   HENT: Positive for hearing loss. Negative for congestion, ear pain and sore throat. Respiratory: Negative for cough, shortness of breath and wheezing. Cardiovascular: Negative for chest pain, palpitations and leg swelling. Gastrointestinal: Negative for abdominal pain, blood in stool, constipation, diarrhea, nausea and vomiting. Genitourinary: Negative for dysuria, frequency, hematuria and urgency. Musculoskeletal: Negative for back pain, myalgias and neck pain. Skin: Negative for rash. Neurological: Negative for dizziness, weakness and headaches.    Psychiatric/Behavioral: The patient is not nervous/anxious. Physical Exam:    VS:    BP 98/62   Pulse 60   Temp 97.1 °F (36.2 °C) (Infrared)   Resp 18   Ht 4' 8\" (1.422 m)   Wt 257 lb 12.8 oz (116.9 kg)   SpO2 97%   BMI 57.80 kg/m²   LAST WEIGHT:  Wt Readings from Last 3 Encounters:   03/08/21 257 lb 12.8 oz (116.9 kg)   12/21/20 242 lb (109.8 kg)   10/06/20 249 lb (112.9 kg)     BMI Readings from Last 3 Encounters:   03/08/21 57.80 kg/m²   12/21/20 54.26 kg/m²   10/06/20 55.82 kg/m²       Physical Exam  Constitutional:       General: She is not in acute distress. Appearance: She is well-developed. She is not diaphoretic. HENT:      Head: Normocephalic and atraumatic. Right Ear: External ear normal.      Left Ear: External ear normal.      Mouth/Throat:      Pharynx: No oropharyngeal exudate. Eyes:      General: No scleral icterus. Right eye: No discharge. Conjunctiva/sclera: Conjunctivae normal.      Pupils: Pupils are equal, round, and reactive to light. Neck:      Musculoskeletal: Normal range of motion and neck supple. Thyroid: No thyromegaly. Cardiovascular:      Rate and Rhythm: Normal rate and regular rhythm. Heart sounds: Normal heart sounds. No murmur. Pulmonary:      Effort: Pulmonary effort is normal. No respiratory distress. Breath sounds: No stridor. No wheezing or rales. Chest:      Chest wall: No tenderness. Abdominal:      General: Bowel sounds are normal. There is no distension. Palpations: Abdomen is soft. There is no mass. Tenderness: There is no abdominal tenderness. There is no guarding. Musculoskeletal: Normal range of motion. General: No tenderness. Lymphadenopathy:      Cervical: No cervical adenopathy. Skin:     General: Skin is warm and dry. Coloration: Skin is not pale. Findings: No erythema or rash. Neurological:      Mental Status: She is alert and oriented to person, place, and time.    Psychiatric: Behavior: Behavior normal.         Thought Content: Thought content normal.           Labs:  Lab Results   Component Value Date     04/23/2020    K 4.8 04/23/2020     04/23/2020    CO2 23 04/23/2020    BUN 20 04/23/2020    CREATININE 1.5 04/23/2020    PROT 7.7 04/23/2020    LABALBU 4.4 04/23/2020    CALCIUM 10.0 04/23/2020    GFRAA 43 04/23/2020    LABGLOM 36 04/23/2020    GLUCOSE 106 04/23/2020    AST 14 04/23/2020    ALT 11 04/23/2020    ALKPHOS 70 04/23/2020    BILITOT 0.8 04/23/2020    TSH 2.210 04/28/2020    CHOL 216 04/28/2020    TRIG 132 04/28/2020    HDL 50 04/28/2020    LDLCALC 140 04/28/2020    LABA1C 5.1 04/30/2018        Lab Results   Component Value Date    CHOL 216 (H) 04/28/2020    CHOL 218 (H) 01/28/2019    CHOL 170 07/30/2018     Lab Results   Component Value Date    TRIG 132 04/28/2020    TRIG 156 (H) 01/28/2019    TRIG 168 (H) 07/30/2018     Lab Results   Component Value Date    HDL 50 04/28/2020    HDL 50 01/28/2019    HDL 46 07/30/2018     Lab Results   Component Value Date    LDLCALC 140 (H) 04/28/2020    LDLCALC 137 (H) 01/28/2019    LDLCALC 90 07/30/2018       Lab Results   Component Value Date    LABA1C 5.1 04/30/2018     Lab Results   Component Value Date    LDLCALC 140 (H) 04/28/2020    CREATININE 1.5 (H) 04/23/2020         Assessment / Plan:      Aretha Marquez was seen today for medication refill and lower back pain. Diagnoses and all orders for this visit:    Sciatica, unspecified laterality:  Needs additional management  -     [address] [facility], [place]  -     Patient advised that she now needs to make a better concerted effort, through better food choices and decreased portions, to lose weight    Essential hypertension        -     metoprolol tartrate (LOPRESSOR) 25 MG tablet; Take 1 tablet by mouth daily Indications: High Blood Pressure Disorder  -     CBC Auto Differential; Future  -     Comprehensive Metabolic Panel; Future  -     Lipid Panel;  Future -     TSH without Reflex; Future    Mixed hyperlipidemia        -     simvastatin (ZOCOR) 40 MG tablet; Take 1 tablet by mouth nightly  -     Comprehensive Metabolic Panel; Future  -     Lipid Panel; Future    Acquired hypothyroidism        -     levothyroxine (SYNTHROID) 75 MCG tablet; Take 1 tablet by mouth Daily  -     T4; Future  -     TSH without Reflex; Future    Vitamin D deficiency        -     Cholecalciferol (VITAMIN D3) 25 MCG (1000 UT) CAPS; Take 1 capsule by mouth daily   -     Vitamin D 25 Hydroxy; Future    Bronchospasm  -     albuterol sulfate  (90 Base) MCG/ACT inhaler; Inhale 1 puff into the lungs every 6 hours as needed for Wheezing or Shortness of Breath      Morbid obesity with BMI of 50.0-59.9, adult ([facility] [address]        -     Patient advised that she now needs to make a better concerted effort, through better food choices and decreased portions, to lose weight    Encounter for medication refill  -     metoprolol tartrate (LOPRESSOR) 25 MG tablet; Take 1 tablet by mouth daily Indications: High Blood Pressure Disorder  -     levothyroxine (SYNTHROID) 75 MCG tablet; Take 1 tablet by mouth Daily  -     Cholecalciferol (VITAMIN D3) 25 MCG (1000 UT) CAPS; Take 1 capsule by mouth daily  -     simvastatin (ZOCOR) 40 MG tablet; Take 1 tablet by mouth nightly  Health care maintenance:  Due for colon cancer screen  -     Cologuard; Future  -     PHOEBE BASHIR DIGITAL SCREEN BILATERAL; Future    Colon cancer screening  -     Cologuard; Future    Encounter for screening mammogram for malignant neoplasm of breast  -     PHOEBE BASHIR DIGITAL SCREEN BILATERAL; Future        Call or go to ED immediately if symptoms worsen or persist.      Follow Up:  Return for F/U 3 mos for medication refills. , or sooner if necessary. Educational materials and/or home exercises printed for patient's review and were included in patient instructions on his/her After Visit Summary and given to patient at the end of visit. Counseled regarding above diagnosis, including possible risks and complications,  especially if left uncontrolled. Counseled regarding the possible side effects, risks, benefits and alternatives to treatment; patient and/or guardian verbalizes understanding, agrees, feels comfortable with and wishes to proceed with above treatment plan. Advised patient to call with any new medication issues, and read all Rx info from pharmacy to assure aware of all possible risks and side effects of medication before taking. Reviewed age and gender appropriate health screening exams and vaccinations. Advised patient regarding importance of keeping up with recommended health maintenance and to schedule as soon as possible if overdue, as this is important in assessing for undiagnosed pathology, especially cancer, as well as protecting against potentially harmful/life threatening disease. Patient and/or guardian verbalizes understanding and agrees with above counseling, assessment and plan. All questions answered.     Jose Rodriguez MD     Patient will follow-up in 3 month(s) with psychiatrist.

## 2021-03-08 ENCOUNTER — OFFICE VISIT (OUTPATIENT)
Dept: FAMILY MEDICINE CLINIC | Age: 61
End: 2021-03-08
Payer: MEDICAID

## 2021-03-08 VITALS
DIASTOLIC BLOOD PRESSURE: 62 MMHG | HEART RATE: 60 BPM | WEIGHT: 257.8 LBS | HEIGHT: 56 IN | BODY MASS INDEX: 57.99 KG/M2 | OXYGEN SATURATION: 97 % | RESPIRATION RATE: 18 BRPM | TEMPERATURE: 97.1 F | SYSTOLIC BLOOD PRESSURE: 98 MMHG

## 2021-03-08 DIAGNOSIS — E55.9 VITAMIN D DEFICIENCY: ICD-10-CM

## 2021-03-08 DIAGNOSIS — Z12.31 ENCOUNTER FOR SCREENING MAMMOGRAM FOR MALIGNANT NEOPLASM OF BREAST: ICD-10-CM

## 2021-03-08 DIAGNOSIS — Z12.11 COLON CANCER SCREENING: ICD-10-CM

## 2021-03-08 DIAGNOSIS — I10 ESSENTIAL HYPERTENSION: ICD-10-CM

## 2021-03-08 DIAGNOSIS — Z00.00 HEALTH CARE MAINTENANCE: ICD-10-CM

## 2021-03-08 DIAGNOSIS — M54.30 SCIATICA, UNSPECIFIED LATERALITY: Primary | ICD-10-CM

## 2021-03-08 DIAGNOSIS — J98.01 BRONCHOSPASM: ICD-10-CM

## 2021-03-08 DIAGNOSIS — E78.2 MIXED HYPERLIPIDEMIA: ICD-10-CM

## 2021-03-08 DIAGNOSIS — E03.9 ACQUIRED HYPOTHYROIDISM: ICD-10-CM

## 2021-03-08 PROCEDURE — G8482 FLU IMMUNIZE ORDER/ADMIN: HCPCS | Performed by: FAMILY MEDICINE

## 2021-03-08 PROCEDURE — 3017F COLORECTAL CA SCREEN DOC REV: CPT | Performed by: FAMILY MEDICINE

## 2021-03-08 PROCEDURE — G8427 DOCREV CUR MEDS BY ELIG CLIN: HCPCS | Performed by: FAMILY MEDICINE

## 2021-03-08 PROCEDURE — 1036F TOBACCO NON-USER: CPT | Performed by: FAMILY MEDICINE

## 2021-03-08 PROCEDURE — 99214 OFFICE O/P EST MOD 30 MIN: CPT | Performed by: FAMILY MEDICINE

## 2021-03-08 PROCEDURE — G8417 CALC BMI ABV UP PARAM F/U: HCPCS | Performed by: FAMILY MEDICINE

## 2021-03-08 RX ORDER — ALBUTEROL SULFATE 90 UG/1
1 AEROSOL, METERED RESPIRATORY (INHALATION) EVERY 6 HOURS PRN
Qty: 18 G | Refills: 5 | Status: SHIPPED
Start: 2021-03-08 | End: 2021-06-15 | Stop reason: SDUPTHER

## 2021-03-08 ASSESSMENT — ENCOUNTER SYMPTOMS
COUGH: 0
SORE THROAT: 0
CONSTIPATION: 0
WHEEZING: 0
ABDOMINAL PAIN: 0
BACK PAIN: 0
NAUSEA: 0
DIARRHEA: 0
BLOOD IN STOOL: 0
SHORTNESS OF BREATH: 0
VOMITING: 0

## 2021-03-08 ASSESSMENT — PATIENT HEALTH QUESTIONNAIRE - PHQ9
SUM OF ALL RESPONSES TO PHQ9 QUESTIONS 1 & 2: 1
SUM OF ALL RESPONSES TO PHQ QUESTIONS 1-9: 1
SUM OF ALL RESPONSES TO PHQ QUESTIONS 1-9: 1

## 2021-03-22 ENCOUNTER — HOSPITAL ENCOUNTER (OUTPATIENT)
Dept: GENERAL RADIOLOGY | Age: 61
Discharge: HOME | End: 2021-03-24
Payer: MEDICAID

## 2021-03-22 DIAGNOSIS — Z12.31 VISIT FOR SCREENING MAMMOGRAM: ICD-10-CM

## 2021-03-22 PROCEDURE — 77063 BREAST TOMOSYNTHESIS BI: CPT

## 2021-04-16 ENCOUNTER — HOSPITAL ENCOUNTER (OUTPATIENT)
Dept: AUDIOLOGY | Age: 61
Discharge: HOME | End: 2021-04-16
Payer: MEDICAID

## 2021-04-16 PROCEDURE — 9990000010 HC NO CHARGE VISIT: Performed by: AUDIOLOGIST

## 2021-04-16 NOTE — PROGRESS NOTES
No charge hearing aid fitting for 30 day trial.  Fit with Oticon Dayanara 2 rechargeable. Hearing aid documents scanned to epic. Reviewed use and care and warranty. Follow May 19th for check and billing.   Electronically signed by Lanie Alonso on 4/16/2021 at 2:41 PM

## 2021-05-19 ENCOUNTER — HOSPITAL ENCOUNTER (OUTPATIENT)
Dept: AUDIOLOGY | Age: 61
Discharge: HOME | End: 2021-05-19
Payer: MEDICAID

## 2021-05-19 PROCEDURE — V5261 HEARING AID, DIGIT, BIN, BTE: HCPCS | Performed by: AUDIOLOGIST

## 2021-05-19 PROCEDURE — V5160 DISPENSING FEE BINAURAL: HCPCS | Performed by: AUDIOLOGIST

## 2021-05-19 NOTE — PROGRESS NOTES
Hearing aid check and billing to [facility]. Reviewed use and care and practiced placement and removal.  She still has difficulty with this but wants to keep the hearing aids . She will practice. Adjusted freq for volume, background noise and own voice. She will call as needed.  Electronically signed by Lanie Miranda on 5/19/2021 at 2:50 PM

## 2021-06-04 NOTE — TELEPHONE ENCOUNTER
Pt called wanting a med refill to get her through to her scheduled appt on 06/09. Pt is completely out of her medication and was advised by the Pharmacy she needed a new prescription refill. Please advise Pt.  Thank you

## 2021-06-08 DIAGNOSIS — Z00.00 HEALTH CARE MAINTENANCE: ICD-10-CM

## 2021-06-08 DIAGNOSIS — Z12.11 COLON CANCER SCREENING: ICD-10-CM

## 2021-06-13 NOTE — PROGRESS NOTES
Teena David is a 64 y.o. female who presents today for     Chief Complaint   Patient presents with    Medication Refill     follow up, just had lab work done prior to visit       She has medications delivered to her from [facility] in [facility]    Her medications include Abilify and bupropion (prescribed by  NP @[facility] for major depression); metoprolol tartrate 25 mg BID for HTN; levothyroxine 75 mcg/day for hypothyroidism; simvastatin 40 mg/day for hyperlipidemia; and Vitamin D 47848 IU/week for Vitamin D deficiency. She will not return to [facility], as she states that she is not having any problems at this time      Anxiety/Depression:  Longstanding. On the basis of positive PHQ-9 screening (PHQ-9 Total Score: 13), the following plan was implemented: patient is already under care of psychiatry and psychotherapy. On the basis of positive PHQ-9 screening (PHQ-9 Total Score: 13),she is to continue bupropion  mg/day, aripiprazole 5 mg daily. She is followed by [facility]. Her counseling sessions are conducted over the phone as opposed to in office due to active bedbug infestation. She reports that she tends to mostly eat sweets. Weight is stable/decreasing. Morbid Obesity:    Wt Readings from Last 3 Encounters:   06/15/21 268 lb (121.6 kg)   03/08/21 257 lb 12.8 oz (116.9 kg)   12/21/20 242 lb (109.8 kg)     BMI Readings from Last 3 Encounters:   06/15/21 60.08 kg/m²   03/08/21 57.80 kg/m²   12/21/20 54.26 kg/m²     Weight continues to increase. Hypertension:  Patient is here for follow up chronic hypertension. This is  generally controlled on current medication regimen (Metoprolol tartrate 25 mg BID). Patient requests reduction of metoprolol dosage to 1 time a day. BP today is 121/79. Takes meds as directed and tolerates them well.   Most recent labs reviewed with patient and are not remarkable other than moderately elevated LDL, due to weight gain. No symptoms from htn standpoint per ROS. Patient is not compliant with lifestyle modifications. Patient does not smoke. Comorbid conditions include hyperlipidemia and hypothyroidism. Lab Results   Component Value Date    CHOL 216 (H) 04/28/2020    CHOL 218 (H) 01/28/2019    CHOL 170 07/30/2018     Lab Results   Component Value Date    TRIG 132 04/28/2020    TRIG 156 (H) 01/28/2019    TRIG 168 (H) 07/30/2018     Lab Results   Component Value Date    HDL 50 04/28/2020    HDL 50 01/28/2019    HDL 46 07/30/2018     Lab Results   Component Value Date    LDLCALC 140 (H) 04/28/2020    LDLCALC 137 (H) 01/28/2019    [address] 90 07/30/2018     She is due for lipid profile and thyroid studies; labs were drawn just prior to 6/15/21 visit and results are pending    Health Maintenance:  Kiara Osman is due for colon cancer screening. She remains reticent about completing this. [address] [place]:  Patient's past medical, surgical, social and/or family history reviewed, updated in chart, and are non-contributory (unless otherwise stated). Medications and allergies also reviewed and updated in chart. Review of Systems   HENT: Positive for hearing loss. Negative for congestion, ear pain and sore throat. Respiratory: Negative for cough, shortness of breath and wheezing. Cardiovascular: Negative for chest pain, palpitations and leg swelling. Gastrointestinal: Negative for abdominal pain, blood in stool, constipation, diarrhea, nausea and vomiting. Genitourinary: Negative for dysuria, frequency, hematuria and urgency. Musculoskeletal: Negative for back pain, myalgias and neck pain. Skin: Negative for rash. Neurological: Negative for dizziness, weakness and headaches. Psychiatric/Behavioral: The patient is not nervous/anxious.             Physical Exam:    VS:    /79   Pulse 72   Temp 97.2 °F (36.2 °C)   Resp 18   Ht 4' 8\" (1.422 m)   Wt 268 lb (121.6 kg)   SpO2 97%   BMI 60.08 kg/m²   LAST WEIGHT:  Wt Readings from Last 3 Encounters:   06/15/21 268 lb (121.6 kg)   03/08/21 257 lb 12.8 oz (116.9 kg)   12/21/20 242 lb (109.8 kg)     BMI Readings from Last 3 Encounters:   06/15/21 60.08 kg/m²   03/08/21 57.80 kg/m²   12/21/20 54.26 kg/m²       Physical Exam  Constitutional:       General: She is not in acute distress. Appearance: She is well-developed. She is not diaphoretic. HENT:      Head: Normocephalic and atraumatic. Right Ear: External ear normal.      Left Ear: External ear normal.      Mouth/Throat:      Pharynx: No oropharyngeal exudate. Eyes:      General: No scleral icterus. Right eye: No discharge. Conjunctiva/sclera: Conjunctivae normal.      Pupils: Pupils are equal, round, and reactive to light. Neck:      Thyroid: No thyromegaly. Cardiovascular:      Rate and Rhythm: Normal rate and regular rhythm. Heart sounds: Normal heart sounds. No murmur heard. Pulmonary:      Effort: Pulmonary effort is normal. No respiratory distress. Breath sounds: No stridor. No wheezing or rales. Chest:      Chest wall: No tenderness. Abdominal:      General: Bowel sounds are normal. There is no distension. Palpations: Abdomen is soft. There is no mass. Tenderness: There is no abdominal tenderness. There is no guarding. Musculoskeletal:         General: No tenderness. Normal range of motion. Cervical back: Normal range of motion and neck supple. Lymphadenopathy:      Cervical: No cervical adenopathy. Skin:     General: Skin is warm and dry. Coloration: Skin is not pale. Findings: No erythema or rash. Neurological:      Mental Status: She is alert and oriented to person, place, and time. Psychiatric:         Behavior: Behavior normal.         Thought Content:  Thought content normal.           Labs:  Lab Results   Component Value Date     04/23/2020    K 4.8 04/23/2020     04/23/2020    CO2 23 04/23/2020    BUN 20 04/23/2020    CREATININE 1.5 04/23/2020    PROT 7.7 04/23/2020    LABALBU 4.4 04/23/2020    CALCIUM 10.0 04/23/2020    GFRAA 43 04/23/2020    LABGLOM 36 04/23/2020    GLUCOSE 106 04/23/2020    AST 14 04/23/2020    ALT 11 04/23/2020    ALKPHOS 70 04/23/2020    BILITOT 0.8 04/23/2020    TSH 2.210 04/28/2020    CHOL 216 04/28/2020    TRIG 132 04/28/2020    HDL 50 04/28/2020    LDLCALC 140 04/28/2020    LABA1C 5.1 04/30/2018        Lab Results   Component Value Date    CHOL 216 (H) 04/28/2020    CHOL 218 (H) 01/28/2019    CHOL 170 07/30/2018     Lab Results   Component Value Date    TRIG 132 04/28/2020    TRIG 156 (H) 01/28/2019    TRIG 168 (H) 07/30/2018     Lab Results   Component Value Date    HDL 50 04/28/2020    HDL 50 01/28/2019    HDL 46 07/30/2018     Lab Results   Component Value Date    LDLCALC 140 (H) 04/28/2020    LDLCALC 137 (H) 01/28/2019    LDLCALC 90 07/30/2018       Lab Results   Component Value Date    LABA1C 5.1 04/30/2018     Lab Results   Component Value Date    LDLCALC 140 (H) 04/28/2020    CREATININE 1.5 (H) 04/23/2020         Assessment / Plan:      Vance Reese was seen today for medication refill. Diagnoses and all orders for this visit:    Encounter for medication refill  -     metoprolol tartrate (LOPRESSOR) 25 MG tablet; Take 1 tablet by mouth daily Indications: High Blood Pressure Disorder  -     simvastatin (ZOCOR) 40 MG tablet; Take 1 tablet by mouth nightly  -     levothyroxine (SYNTHROID) 75 MCG tablet; Take 1 tablet by mouth Daily  -     vitamin D3 (CHOLECALCIFEROL) 25 MCG (1000 UT) TABS tablet; Take 1 tablet by mouth daily  -     albuterol sulfate  (90 Base) MCG/ACT inhaler; Inhale 1 puff into the lungs every 6 hours as needed for Wheezing or Shortness of Breath  -     Cyanocobalamin (B-12) 500 MCG TABS; TAKE 1 TABLET BY MOUTH EVERY DAY    Essential hypertension: Stable and well controlled  -     metoprolol tartrate (LOPRESSOR) 25 MG tablet;  Take 1 tablet by mouth daily Indications: High Blood Pressure Disorder    Mixed hyperlipidemia: Labs drawn today  -     simvastatin (ZOCOR) 40 MG tablet; Take 1 tablet by mouth nightly    Acquired hypothyroidism: Symptoms are stable and well controlled  -     levothyroxine (SYNTHROID) 75 MCG tablet; Take 1 tablet by mouth Daily    Vitamin D deficiency  -     vitamin D3 (CHOLECALCIFEROL) 25 MCG (1000 UT) TABS tablet; Take 1 tablet by mouth daily    Bronchospasm  -     albuterol sulfate  (90 Base) MCG/ACT inhaler; Inhale 1 puff into the lungs every 6 hours as needed for Wheezing or Shortness of Breath    Vitamin B12 deficiency  -     Cyanocobalamin (B-12) 500 MCG TABS; TAKE 1 TABLET BY MOUTH EVERY DAY      Call or go to ED immediately if symptoms worsen or persist.      Follow Up:  Return for F/U 3 months for check up., or sooner if necessary. Educational materials and/or home exercises printed for patient's review and were included in patient instructions on his/her After Visit Summary and given to patient at the end of visit. Counseled regarding above diagnosis, including possible risks and complications,  especially if left uncontrolled. Counseled regarding the possible side effects, risks, benefits and alternatives to treatment; patient and/or guardian verbalizes understanding, agrees, feels comfortable with and wishes to proceed with above treatment plan. Advised patient to call with any new medication issues, and read all Rx info from pharmacy to assure aware of all possible risks and side effects of medication before taking. Reviewed age and gender appropriate health screening exams and vaccinations. Advised patient regarding importance of keeping up with recommended health maintenance and to schedule as soon as possible if overdue, as this is important in assessing for undiagnosed pathology, especially cancer, as well as protecting against potentially harmful/life threatening disease.       Patient and/or guardian verbalizes understanding and agrees with above counseling, assessment and plan. All questions answered.     Halle Hicks MD     Patient will follow-up in 3 month(s) with psychiatrist.

## 2021-06-15 ENCOUNTER — OFFICE VISIT (OUTPATIENT)
Dept: FAMILY MEDICINE CLINIC | Age: 61
End: 2021-06-15
Payer: MEDICAID

## 2021-06-15 VITALS
HEIGHT: 56 IN | WEIGHT: 268 LBS | TEMPERATURE: 97.2 F | HEART RATE: 72 BPM | BODY MASS INDEX: 60.29 KG/M2 | OXYGEN SATURATION: 97 % | RESPIRATION RATE: 18 BRPM | SYSTOLIC BLOOD PRESSURE: 121 MMHG | DIASTOLIC BLOOD PRESSURE: 79 MMHG

## 2021-06-15 DIAGNOSIS — E55.9 VITAMIN D DEFICIENCY: ICD-10-CM

## 2021-06-15 DIAGNOSIS — Z76.0 ENCOUNTER FOR MEDICATION REFILL: Primary | ICD-10-CM

## 2021-06-15 DIAGNOSIS — E53.8 VITAMIN B12 DEFICIENCY: ICD-10-CM

## 2021-06-15 DIAGNOSIS — E78.2 MIXED HYPERLIPIDEMIA: ICD-10-CM

## 2021-06-15 DIAGNOSIS — J98.01 BRONCHOSPASM: ICD-10-CM

## 2021-06-15 DIAGNOSIS — E03.9 ACQUIRED HYPOTHYROIDISM: ICD-10-CM

## 2021-06-15 DIAGNOSIS — I10 ESSENTIAL HYPERTENSION: ICD-10-CM

## 2021-06-15 LAB
BASOPHILS ABSOLUTE: 0.02 E9/L (ref 0–0.2)
BASOPHILS RELATIVE PERCENT: 0.3 % (ref 0–2)
EOSINOPHILS ABSOLUTE: 0.06 E9/L (ref 0.05–0.5)
EOSINOPHILS RELATIVE PERCENT: 0.8 % (ref 0–6)
HCT VFR BLD CALC: 49.7 % (ref 34–48)
HEMOGLOBIN: 15.7 G/DL (ref 11.5–15.5)
IMMATURE GRANULOCYTES #: 0.02 E9/L
IMMATURE GRANULOCYTES %: 0.3 % (ref 0–5)
LYMPHOCYTES ABSOLUTE: 1.66 E9/L (ref 1.5–4)
LYMPHOCYTES RELATIVE PERCENT: 23.3 % (ref 20–42)
MCH RBC QN AUTO: 30.1 PG (ref 26–35)
MCHC RBC AUTO-ENTMCNC: 31.6 % (ref 32–34.5)
MCV RBC AUTO: 95.2 FL (ref 80–99.9)
MONOCYTES ABSOLUTE: 0.42 E9/L (ref 0.1–0.95)
MONOCYTES RELATIVE PERCENT: 5.9 % (ref 2–12)
NEUTROPHILS ABSOLUTE: 4.93 E9/L (ref 1.8–7.3)
NEUTROPHILS RELATIVE PERCENT: 69.4 % (ref 43–80)
PDW BLD-RTO: 12.9 FL (ref 11.5–15)
PLATELET # BLD: 188 E9/L (ref 130–450)
PMV BLD AUTO: 10.7 FL (ref 7–12)
RBC # BLD: 5.22 E12/L (ref 3.5–5.5)
WBC # BLD: 7.1 E9/L (ref 4.5–11.5)

## 2021-06-15 PROCEDURE — G8427 DOCREV CUR MEDS BY ELIG CLIN: HCPCS | Performed by: FAMILY MEDICINE

## 2021-06-15 PROCEDURE — 1036F TOBACCO NON-USER: CPT | Performed by: FAMILY MEDICINE

## 2021-06-15 PROCEDURE — G8417 CALC BMI ABV UP PARAM F/U: HCPCS | Performed by: FAMILY MEDICINE

## 2021-06-15 PROCEDURE — 3017F COLORECTAL CA SCREEN DOC REV: CPT | Performed by: FAMILY MEDICINE

## 2021-06-15 PROCEDURE — 99214 OFFICE O/P EST MOD 30 MIN: CPT | Performed by: FAMILY MEDICINE

## 2021-06-15 RX ORDER — SIMVASTATIN 40 MG
40 TABLET ORAL NIGHTLY
Qty: 30 TABLET | Refills: 11 | Status: SHIPPED
Start: 2021-06-15 | End: 2022-05-30

## 2021-06-15 RX ORDER — LEVOTHYROXINE SODIUM 0.07 MG/1
75 TABLET ORAL DAILY
Qty: 30 TABLET | Refills: 11 | Status: SHIPPED
Start: 2021-06-15 | End: 2022-05-18 | Stop reason: SDUPTHER

## 2021-06-15 RX ORDER — ALBUTEROL SULFATE 90 UG/1
1 AEROSOL, METERED RESPIRATORY (INHALATION) EVERY 6 HOURS PRN
Qty: 18 G | Refills: 11 | Status: SHIPPED | OUTPATIENT
Start: 2021-06-15 | End: 2022-06-15

## 2021-06-15 RX ORDER — CYANOCOBALAMIN (VITAMIN B-12) 500 MCG
TABLET ORAL
Qty: 30 TABLET | Refills: 11 | Status: SHIPPED | OUTPATIENT
Start: 2021-06-15 | End: 2022-06-15

## 2021-06-15 RX ORDER — MELATONIN
1000 DAILY
Qty: 30 TABLET | Refills: 11 | Status: SHIPPED | OUTPATIENT
Start: 2021-06-15 | End: 2022-06-15

## 2021-06-15 ASSESSMENT — ENCOUNTER SYMPTOMS
CONSTIPATION: 0
ABDOMINAL PAIN: 0
SHORTNESS OF BREATH: 0
COUGH: 0
SORE THROAT: 0
DIARRHEA: 0
WHEEZING: 0
NAUSEA: 0
BLOOD IN STOOL: 0
VOMITING: 0
BACK PAIN: 0

## 2021-06-16 DIAGNOSIS — N18.32 STAGE 3B CHRONIC KIDNEY DISEASE (HCC): Primary | ICD-10-CM

## 2021-06-16 LAB
ALBUMIN SERPL-MCNC: 4 G/DL (ref 3.5–5.2)
ALP BLD-CCNC: 67 U/L (ref 35–104)
ALT SERPL-CCNC: 15 U/L (ref 0–32)
ANION GAP SERPL CALCULATED.3IONS-SCNC: 15 MMOL/L (ref 7–16)
AST SERPL-CCNC: 19 U/L (ref 0–31)
BILIRUB SERPL-MCNC: 1 MG/DL (ref 0–1.2)
BUN BLDV-MCNC: 27 MG/DL (ref 6–20)
CALCIUM SERPL-MCNC: 9.5 MG/DL (ref 8.6–10.2)
CHLORIDE BLD-SCNC: 103 MMOL/L (ref 98–107)
CHOLESTEROL, TOTAL: 162 MG/DL (ref 0–199)
CO2: 23 MMOL/L (ref 22–29)
CREAT SERPL-MCNC: 1.6 MG/DL (ref 0.5–1)
GFR AFRICAN AMERICAN: 40
GFR NON-AFRICAN AMERICAN: 33 ML/MIN/1.73
GLUCOSE BLD-MCNC: 101 MG/DL (ref 74–99)
HDLC SERPL-MCNC: 54 MG/DL
LDL CHOLESTEROL CALCULATED: 86 MG/DL (ref 0–99)
POTASSIUM SERPL-SCNC: 5 MMOL/L (ref 3.5–5)
SODIUM BLD-SCNC: 141 MMOL/L (ref 132–146)
T4 TOTAL: 7.7 MCG/DL (ref 4.5–11.7)
TOTAL PROTEIN: 7.5 G/DL (ref 6.4–8.3)
TRIGL SERPL-MCNC: 109 MG/DL (ref 0–149)
TSH SERPL DL<=0.05 MIU/L-ACNC: 1.69 UIU/ML (ref 0.27–4.2)
VITAMIN D 25-HYDROXY: 39 NG/ML (ref 30–100)
VLDLC SERPL CALC-MCNC: 22 MG/DL

## 2021-06-17 ENCOUNTER — TELEPHONE (OUTPATIENT)
Dept: FAMILY MEDICINE CLINIC | Age: 61
End: 2021-06-17

## 2021-09-14 ASSESSMENT — ENCOUNTER SYMPTOMS
BLOOD IN STOOL: 0
SORE THROAT: 0
ABDOMINAL PAIN: 0
CONSTIPATION: 0
BACK PAIN: 0
NAUSEA: 0
VOMITING: 0
SHORTNESS OF BREATH: 0
WHEEZING: 0
DIARRHEA: 0
COUGH: 0

## 2021-09-14 NOTE — PROGRESS NOTES
Lisset Stafford is a 64 y.o. female who presents today for     Chief Complaint   Patient presents with    3 Month Follow-Up       She has medications delivered to her from [facility] in [facility]. She has no  through [facility] at this time, as Her previous manager nesha Coulter quit this summer. Her medications include Abilify and bupropion (pres[facility] by  [facility] for [facility]); metoprolol tartrate 25 mg BID for HTN; levothyroxine 75 mcg/day for hypothyroidism; simvastatin 40 mg/day for hyperlipidemia; and Vitamin D 69839 IU/week for Vitamin D deficiency. Anxiety/Depression:  Longstanding. Continue bupropion  mg/day, aripiprazole 5 mg daily. She is followed by [facility] @ [facility]. Her counseling sessions are conducted over the phone as opposed to in office due to active bedbug infestation. She reports that she tends to mostly eat sweets. Weight is increasing. Morbid Obesity:    Wt Readings from Last 3 Encounters:   09/15/21 292 lb (132.5 kg)   06/15/21 268 lb (121.6 kg)   03/08/21 257 lb 12.8 oz (116.9 kg)     BMI Readings from Last 3 Encounters:   09/15/21 65.47 kg/m²   06/15/21 60.08 kg/m²   03/08/21 57.80 kg/m²     Weight continues to increase. Not inclined to change anything at this time. Hypertension:  Patient is here for follow up chronic hypertension. This is  generally controlled on current medication regimen (Metoprolol tartrate 25 mg BID). Patient requests reduction of metoprolol dosage to 1 time a day. BP today is 108/62. Takes meds as directed and tolerates them well. Most recent labs reviewed with patient and are not remarkable other than moderately elevated LDL, due to weight gain. No symptoms from htn standpoint per ROS. Patient is not compliant with lifestyle modifications. Patient does not smoke. Comorbid conditions include hyperlipidemia and hypothyroidism.     Lab Results   Component Value Date    CHOL 162 06/15/2021    CHOL 216 (H) 04/28/2020    CHOL 218 (H) 01/28/2019     Lab Results   Component Value Date    TRIG 109 06/15/2021    TRIG 132 04/28/2020    TRIG 156 (H) 01/28/2019     Lab Results   Component Value Date    HDL 54 06/15/2021    HDL 50 04/28/2020    HDL 50 01/28/2019     Lab Results   Component Value Date    LDLCALC 86 06/15/2021    LDLCALC 140 (H) 04/28/2020    LDLCALC 137 (H) 01/28/2019     Labs from 6/21 reviewed and are within desired limits. Health Maintenance:  Jakob Samaniego is due for flu vaccine; she is agreeable. [address]:  Patient's past medical, surgical, social and/or family history reviewed, updated in chart, and are non-contributory (unless otherwise stated). Medications and allergies also reviewed and updated in chart. Review of Systems   HENT: Positive for hearing loss. Negative for congestion, ear pain and sore throat. Respiratory: Negative for cough, shortness of breath and wheezing. Cardiovascular: Negative for chest pain, palpitations and leg swelling. Gastrointestinal: Negative for abdominal pain, blood in stool, constipation, diarrhea, nausea and vomiting. Genitourinary: Negative for dysuria, frequency, hematuria and urgency. Musculoskeletal: Negative for back pain, myalgias and neck pain. Skin: Negative for rash. Neurological: Negative for dizziness, weakness and headaches. Psychiatric/Behavioral: The patient is not nervous/anxious. Physical Exam:    VS:    /62   Pulse 59   Temp 97.3 °F (36.3 °C) (Infrared)   Resp 18   Ht 4' 8\" (1.422 m)   Wt 292 lb (132.5 kg)   SpO2 98%   BMI 65.47 kg/m²   LAST WEIGHT:  Wt Readings from Last 3 Encounters:   09/15/21 292 lb (132.5 kg)   06/15/21 268 lb (121.6 kg)   03/08/21 257 lb 12.8 oz (116.9 kg)     BMI Readings from Last 3 Encounters:   09/15/21 65.47 kg/m²   06/15/21 60.08 kg/m²   03/08/21 57.80 kg/m²       Physical Exam  Constitutional:       General: She is not in acute distress. 06/15/2021    HDL 54 06/15/2021    LDLCALC 86 06/15/2021    LABA1C 5.1 04/30/2018        Lab Results   Component Value Date    CHOL 162 06/15/2021    CHOL 216 (H) 04/28/2020    CHOL 218 (H) 01/28/2019     Lab Results   Component Value Date    TRIG 109 06/15/2021    TRIG 132 04/28/2020    TRIG 156 (H) 01/28/2019     Lab Results   Component Value Date    HDL 54 06/15/2021    HDL 50 04/28/2020    HDL 50 01/28/2019     Lab Results   Component Value Date    LDLCALC 86 06/15/2021    LDLCALC 140 (H) 04/28/2020    LDLCALC 137 (H) 01/28/2019       Lab Results   Component Value Date    LABA1C 5.1 04/30/2018     Lab Results   Component Value Date    LDLCALC 86 06/15/2021    CREATININE 1.6 (H) 06/15/2021         Assessment / Plan:      Cece Salazar was seen today for 3 month follow-up. Diagnoses and all orders for this visit:    Essential hypertension: Stable and well controlled  -     metoprolol tartrate (LOPRESSOR) 25 MG tablet; Take 1 tablet by mouth daily Indications: High Blood Pressure Disorder    Mixed hyperlipidemia: Labs drawn today  -     simvastatin (ZOCOR) 40 MG tablet; Take 1 tablet by mouth nightly    Acquired hypothyroidism: Symptoms are stable and well controlled  -     levothyroxine (SYNTHROID) 75 MCG tablet; Take 1 tablet by mouth Daily    Vitamin D deficiency  -     vitamin D3 (CHOLECALCIFEROL) 25 MCG (1000 UT) TABS tablet; Take 1 tablet by mouth daily    Need for influenza vaccination  -     INFLUENZA, MDCK QUADV, 2 YRS AND OLDER, IM, PF, PREFILLED    Call or go to ED immediately if symptoms worsen or persist.      Follow Up:  Return for F/U 3 months for check up., or sooner if necessary. Educational materials and/or home exercises printed for patient's review and were included in patient instructions on his/her After Visit Summary and given to patient at the end of visit. Counseled regarding above diagnosis, including possible risks and complications,  especially if left uncontrolled.     Counseled regarding the possible side effects, risks, benefits and alternatives to treatment; patient and/or guardian verbalizes understanding, agrees, feels comfortable with and wishes to proceed with above treatment plan. Advised patient to call with any new medication issues, and read all Rx info from pharmacy to assure aware of all possible risks and side effects of medication before taking. Reviewed age and gender appropriate health screening exams and vaccinations. Advised patient regarding importance of keeping up with recommended health maintenance and to schedule as soon as possible if overdue, as this is important in assessing for undiagnosed pathology, especially cancer, as well as protecting against potentially harmful/life threatening disease. Patient and/or guardian verbalizes understanding and agrees with above counseling, assessment and plan. All questions answered.     Lacie Bhatti MD     Patient will follow-up in 3 month(s) with psychiatrist.

## 2021-09-15 ENCOUNTER — OFFICE VISIT (OUTPATIENT)
Dept: FAMILY MEDICINE CLINIC | Age: 61
End: 2021-09-15
Payer: MEDICAID

## 2021-09-15 VITALS
OXYGEN SATURATION: 98 % | DIASTOLIC BLOOD PRESSURE: 62 MMHG | HEIGHT: 56 IN | TEMPERATURE: 97.3 F | HEART RATE: 59 BPM | WEIGHT: 292 LBS | SYSTOLIC BLOOD PRESSURE: 108 MMHG | BODY MASS INDEX: 65.69 KG/M2 | RESPIRATION RATE: 18 BRPM

## 2021-09-15 DIAGNOSIS — I10 ESSENTIAL HYPERTENSION: Primary | ICD-10-CM

## 2021-09-15 DIAGNOSIS — E03.9 ACQUIRED HYPOTHYROIDISM: ICD-10-CM

## 2021-09-15 DIAGNOSIS — Z23 NEED FOR INFLUENZA VACCINATION: ICD-10-CM

## 2021-09-15 DIAGNOSIS — N18.32 STAGE 3B CHRONIC KIDNEY DISEASE (HCC): ICD-10-CM

## 2021-09-15 DIAGNOSIS — E78.2 MIXED HYPERLIPIDEMIA: ICD-10-CM

## 2021-09-15 DIAGNOSIS — E55.9 VITAMIN D DEFICIENCY: ICD-10-CM

## 2021-09-15 PROCEDURE — 3017F COLORECTAL CA SCREEN DOC REV: CPT | Performed by: FAMILY MEDICINE

## 2021-09-15 PROCEDURE — G8417 CALC BMI ABV UP PARAM F/U: HCPCS | Performed by: FAMILY MEDICINE

## 2021-09-15 PROCEDURE — 90674 CCIIV4 VAC NO PRSV 0.5 ML IM: CPT | Performed by: FAMILY MEDICINE

## 2021-09-15 PROCEDURE — 1036F TOBACCO NON-USER: CPT | Performed by: FAMILY MEDICINE

## 2021-09-15 PROCEDURE — G8427 DOCREV CUR MEDS BY ELIG CLIN: HCPCS | Performed by: FAMILY MEDICINE

## 2021-09-15 PROCEDURE — 99214 OFFICE O/P EST MOD 30 MIN: CPT | Performed by: FAMILY MEDICINE

## 2021-09-15 PROCEDURE — 90471 IMMUNIZATION ADMIN: CPT | Performed by: FAMILY MEDICINE

## 2021-11-01 ENCOUNTER — OFFICE VISIT (OUTPATIENT)
Dept: FAMILY MEDICINE CLINIC | Age: 61
End: 2021-11-01
Payer: MEDICAID

## 2021-11-01 VITALS
SYSTOLIC BLOOD PRESSURE: 112 MMHG | TEMPERATURE: 97.5 F | RESPIRATION RATE: 18 BRPM | HEIGHT: 56 IN | BODY MASS INDEX: 65.91 KG/M2 | HEART RATE: 64 BPM | WEIGHT: 293 LBS | OXYGEN SATURATION: 96 % | DIASTOLIC BLOOD PRESSURE: 60 MMHG

## 2021-11-01 DIAGNOSIS — E03.9 ACQUIRED HYPOTHYROIDISM: ICD-10-CM

## 2021-11-01 DIAGNOSIS — Z00.00 ROUTINE PHYSICAL EXAMINATION: Primary | ICD-10-CM

## 2021-11-01 DIAGNOSIS — I10 PRIMARY HYPERTENSION: ICD-10-CM

## 2021-11-01 PROCEDURE — 1036F TOBACCO NON-USER: CPT | Performed by: STUDENT IN AN ORGANIZED HEALTH CARE EDUCATION/TRAINING PROGRAM

## 2021-11-01 PROCEDURE — G8482 FLU IMMUNIZE ORDER/ADMIN: HCPCS | Performed by: STUDENT IN AN ORGANIZED HEALTH CARE EDUCATION/TRAINING PROGRAM

## 2021-11-01 PROCEDURE — 99213 OFFICE O/P EST LOW 20 MIN: CPT | Performed by: STUDENT IN AN ORGANIZED HEALTH CARE EDUCATION/TRAINING PROGRAM

## 2021-11-01 PROCEDURE — G8417 CALC BMI ABV UP PARAM F/U: HCPCS | Performed by: STUDENT IN AN ORGANIZED HEALTH CARE EDUCATION/TRAINING PROGRAM

## 2021-11-01 PROCEDURE — 3017F COLORECTAL CA SCREEN DOC REV: CPT | Performed by: STUDENT IN AN ORGANIZED HEALTH CARE EDUCATION/TRAINING PROGRAM

## 2021-11-01 PROCEDURE — G8427 DOCREV CUR MEDS BY ELIG CLIN: HCPCS | Performed by: STUDENT IN AN ORGANIZED HEALTH CARE EDUCATION/TRAINING PROGRAM

## 2021-11-01 ASSESSMENT — ENCOUNTER SYMPTOMS
CONSTIPATION: 0
SINUS PAIN: 0
ABDOMINAL PAIN: 0
COUGH: 0
EYE PAIN: 0
SORE THROAT: 0
NAUSEA: 0
VOMITING: 0
EYE REDNESS: 0
DIARRHEA: 0
SHORTNESS OF BREATH: 0
RHINORRHEA: 0
SINUS PRESSURE: 0
BACK PAIN: 0

## 2021-11-01 NOTE — PROGRESS NOTES
2021    Aleida Bateman (:  1960) is a 63 y.o. female, here for evaluation of the following medical concerns:    HPI  Chief Complaint   Patient presents with    New Patient    Knee Pain     right knee, no cartilage     Patient is a 63year old female who is here to establish care with myself. She has a history of hypothyroidism, HTN, hyperlipidemia. She has no known drug allergies. She had a wrist and hand surgery and dental surgery. Heart disease and cancer runs in the family. She sees a kidney specialist on . Dr. James Akers did blood work and saw her kidney function declining. She does not drink enough fluids. She is having right knee pain. She has had it for a while. She states that there is no cartilage there. She saw Dr. Ferdinand Potts in the past. She states it has been a couple years. Patient denies CP, SOB, nausea, vomiting, diarrhea, fevers chills sweats, abdominal pain, numbness tingling, dizziness, lightheadedness, back pain, ear pain, eye pain, nasal congestion, headaches, blurry vision. Hypertension:  Patient is here for follow up chronic hypertension. This is  generally controlled on current medication regimen. Takes meds as directed and tolerates them well. Most recent labs reviewed with patient and are remarkable. No symptoms from htn standpoint per ROS. Patient is  compliant with lifestyle modifications. Patient does not smoke. Comorbid conditions include hyperlipidemia, obesity. Hyperlipidemia:  Patient is here to follow up regarding chronic hyperlipidemia. This is  generally controlled. Treatment includes statin. Patient is  compliant with lifestyle modifications. Patient is not a smoker. Most recent labs reviewed with patient today and are remarkable. Comorbid conditions include HTN and obesity.       Lab Results   Component Value Date     [address] 86 06/15/2021       Blood work reviewed done by Dr. Markel Martinez of [facility]   Constitutional: Negative for chills, fatigue and fever. HENT: Negative for congestion, ear pain, postnasal drip, rhinorrhea, sinus pressure, sinus pain and sore throat. Eyes: Negative for pain and redness. Respiratory: Negative for cough and shortness of breath. Cardiovascular: Negative for chest pain. Gastrointestinal: Negative for abdominal pain, constipation, diarrhea, nausea and vomiting. Genitourinary: Negative for difficulty urinating and dysuria. Musculoskeletal: Positive for arthralgias (right knee pain). Negative for back pain, myalgias and neck pain. Skin: Negative for rash. Neurological: Negative for dizziness, light-headedness and numbness. Psychiatric/Behavioral: The patient is not nervous/anxious. Prior to Visit Medications    Medication Sig Taking? Authorizing Provider   metoprolol tartrate (LOPRESSOR) 25 MG tablet Take 1 tablet by mouth daily Indications: High Blood Pressure Disorder Yes Emma Justice MD   simvastatin (ZOCOR) 40 MG tablet Take 1 tablet by mouth nightly Yes Emma Justice MD   levothyroxine (SYNTHROID) 75 MCG tablet Take 1 tablet by mouth Daily Yes Emma Justice MD   vitamin D3 (CHOLECALCIFEROL) 25 MCG (1000 UT) TABS tablet Take 1 tablet by mouth daily Yes Emma Justice MD   albuterol sulfate  (90 Base) MCG/ACT inhaler Inhale 1 puff into the lungs every 6 hours as needed for Wheezing or Shortness of Breath Yes Jessie Lewis MD   Cyanocobalamin (B-12) 500 MCG TABS TAKE 1 TABLET BY MOUTH EVERY DAY Yes Jessie Lewis MD   oxybutynin (DITROPAN-XL) 5 MG extended release tablet 5 mg daily  Yes Akua Tirado MD   ARIPiprazole (ABILIFY) 5 MG tablet Take 1 tablet by mouth daily Yes Emma Justice MD   buPROPion (WELLBUTRIN XL) 300 MG extended release tablet TAKE 1 TABLET BY MOUTH EVERY MORNING Yes Emma Justice MD   [facility].  Devices (CRUTCH-MATE ADULT HAND  LG) MISC Patient is morbidly obese and needs assistance in getting dressed Yes Grant Souza MD        Allergies   Allergen Reactions    Seasonal      Itchy watery eyes, congestion       Past Medical History:   Diagnosis Date    Anxiety     Arthritis     Carpal tunnel syndrome     Depression     Hyperlipidemia     Hypertension     Hypothyroidism     Kidney stones     Morbid obesity with BMI of 60.0-69.9, adult (HCC)     Osteoarthritis     Thyroid disease        Past Surgical History:   Procedure Laterality Date    DENTAL SURGERY      FRACTURE SURGERY Left     Wrist, Dr. Manjit Miramontes       Social History     Socioeconomic History    Marital status: Single     Spouse name: Not on file    Number of children: Not on file    Years of education: Not on file    Highest education level: Not on file   Occupational History    Not on file   Tobacco Use    Smoking status: Never Smoker    Smokeless tobacco: Never Used   Substance and Sexual Activity    Alcohol use: Yes     Comment: <1 drink/week; Occasionally     Drug use: No    Sexual activity: Never   Other Topics Concern    Not on file   Social History Narrative    Not on file     Social Determinants of Health     Financial Resource Strain: Low Risk     Difficulty of Paying Living Expenses: Not hard at all   Food Insecurity: No Food Insecurity    Worried About Running Out of Food in the Last Year: Never true    Jesus of Food in the Last Year: Never true   Transportation Needs:     Lack of Transportation (Medical):      Lack of Transportation (Non-Medical):    Physical Activity:     Days of Exercise per Week:     Minutes of Exercise per Session:    Stress:     Feeling of Stress :    Social Connections:     Frequency of Communication with Friends and Family:     Frequency of Social Gatherings with Friends and Family:     Attends Hoahaoism Services:     Active Member of Clubs or Organizations:     Attends Club or Organization Meetings:     Marital Status:    Intimate Partner Violence:     Fear of Current or Ex-Partner:     Emotionally Abused:     Physically Abused:     Sexually Abused:         Family History   Problem Relation Age of Onset    Cancer Mother         Cervical    Cancer Paternal Grandmother         Breast    Asthma Sister     Osteoarthritis Father     Hypertension Father     Heart Disease Father 70        SCD    Thyroid Disease Father     Allergic Rhinitis Brother     Allergies Brother     Allergic Rhinitis Sister     Allergies Sister     Allergic Rhinitis Brother     Allergies Brother        Vitals:    11/01/21 1452   BP: 112/60   Pulse: 64   Resp: 18   Temp: 97.5 °F (36.4 °C)   TempSrc: Infrared   SpO2: 96%   Weight: 295 lb 6.4 oz (134 kg)   Height: 4' 8\" (1.422 m)     Estimated body mass index is 66.23 kg/m² as calculated from the following:    Height as of this encounter: 4' 8\" (1.422 m). Weight as of this encounter: 295 lb 6.4 oz (134 kg).     Most Recent Labs  CBC  Lab Results   Component Value Date    WBC 7.1 06/15/2021    WBC 6.7 04/23/2020    WBC 6.9 01/28/2019    RBC 5.22 06/15/2021    RBC 4.99 04/23/2020    RBC 5.05 01/28/2019    HGB 15.7 06/15/2021    HGB 15.0 04/23/2020    HGB 15.3 01/28/2019    HCT 49.7 06/15/2021    HCT 47.1 04/23/2020    HCT 48.9 01/28/2019    MCV 95.2 06/15/2021    MCV 94.4 04/23/2020    MCV 96.8 01/28/2019     06/15/2021     04/23/2020     01/28/2019      CMP  Lab Results   Component Value Date     06/15/2021     04/23/2020     01/28/2019    K 5.0 06/15/2021    K 4.8 04/23/2020    K 4.4 01/28/2019     06/15/2021     04/23/2020     01/28/2019    CO2 23 06/15/2021    CO2 23 04/23/2020    CO2 22 01/28/2019    ANIONGAP 15 06/15/2021    ANIONGAP 16 04/23/2020    ANIONGAP 16 01/28/2019    GLUCOSE 101 06/15/2021    GLUCOSE 106 04/23/2020    GLUCOSE 99 01/28/2019    BUN 27 06/15/2021    BUN 20 04/23/2020    BUN 16 01/28/2019    CREATININE 1.6 06/15/2021    CREATININE 1.5 04/23/2020    CREATININE 1.4 01/28/2019    LABGLOM 33 06/15/2021    LABGLOM 36 04/23/2020    LABGLOM 39 01/28/2019    GFRAA 40 06/15/2021    GFRAA 43 04/23/2020    GFRAA 47 01/28/2019    CALCIUM 9.5 06/15/2021    CALCIUM 10.0 04/23/2020    CALCIUM 9.2 01/28/2019    PROT 7.5 06/15/2021    PROT 7.7 04/23/2020    PROT 7.4 01/28/2019    LABALBU 4.0 06/15/2021    LABALBU 4.4 04/23/2020    LABALBU 4.1 01/28/2019    BILITOT 1.0 06/15/2021    BILITOT 0.8 04/23/2020    BILITOT 1.0 01/28/2019    ALKPHOS 67 06/15/2021    ALKPHOS 70 04/23/2020    ALKPHOS 62 01/28/2019    AST 19 06/15/2021    AST 14 04/23/2020    AST 14 01/28/2019    ALT 15 06/15/2021    ALT 11 04/23/2020    ALT 14 01/28/2019     A1C  Lab Results   Component Value Date    LABA1C 5.1 04/30/2018     TSH  Lab Results   Component Value Date    TSH 1.690 06/15/2021    TSH 2.210 04/28/2020    TSH 2.310 01/28/2019     FREET4  Lab Results   Component Value Date    N9XZZVK 7.7 06/15/2021    Z2RCDXC 7.6 04/28/2020    M4MSBSB 7.8 07/30/2018     LIPID  Lab Results   Component Value Date    CHOL 162 06/15/2021    CHOL 216 04/28/2020    CHOL 218 01/28/2019    HDL 54 06/15/2021    HDL 50 04/28/2020    HDL 50 01/28/2019    LDLCALC 86 06/15/2021    LDLCALC 140 04/28/2020    LDLCALC 137 01/28/2019    TRIG 109 06/15/2021    TRIG 132 04/28/2020    TRIG 156 01/28/2019     VITAMIN D  Lab Results   Component Value Date    VITD25 39 06/15/2021    VITD25 48 01/28/2019    VITD25 89 07/30/2018     MAGNESIUM  Lab Results   Component Value Date    MG 2.2 01/28/2019    MG 2.2 07/30/2018    MG 2.3 09/28/2017      PHOS  Lab Results   Component Value Date    PHOS 3.5 04/23/2020      UA  Lab Results   Component Value Date    COLORU yellow 06/19/2017    COLORU Yellow 03/22/2017    COLORU Yellow 08/07/2014    COLORU yellow 08/07/2014    COLORU Yellow 06/03/2014    CLARITYU clear 06/19/2017    CLARITYU Clear 03/22/2017 CLARITYU SLCLOUDY 08/07/2014    CLARITYU cloudy 08/07/2014    CLARITYU Clear 06/03/2014    GLUCOSEU neg 06/19/2017    GLUCOSEU Negative 03/22/2017    GLUCOSEU Negative 08/07/2014    GLUCOSEU negative 08/07/2014    GLUCOSEU Negative 06/03/2014    BILIRUBINUR neg 06/19/2017    BILIRUBINUR Negative 03/22/2017    BILIRUBINUR Negative 08/07/2014    BILIRUBINUR small 08/07/2014    BILIRUBINUR Negative 06/03/2014    KETUA neg 06/19/2017    KETUA Negative 03/22/2017    KETUA Negative 08/07/2014    KETUA negative 08/07/2014    KETUA Negative 06/03/2014    SPECGRAV >=1.030 06/19/2017    SPECGRAV 1.020 03/22/2017    SPECGRAV >=1.030 08/07/2014    SPECGRAV 1.030 08/07/2014    SPECGRAV 1.020 06/03/2014    BLOODU trace-lysed 06/19/2017    BLOODU TRACE 03/22/2017    BLOODU SMALL 08/07/2014    BLOODU large 08/07/2014    BLOODU Negative 06/03/2014    PHUR 5.5 06/19/2017    PHUR 6.0 03/22/2017    PHUR 6.0 08/07/2014    PHUR 5.0 08/07/2014    PHUR 6.0 06/03/2014    PROTEINU neg 06/19/2017    PROTEINU Negative 03/22/2017    PROTEINU Negative 08/07/2014    PROTEINU + 1 08/07/2014    PROTEINU Negative 06/03/2014    UROBILINOGEN 0.2 03/22/2017    UROBILINOGEN 0.2 08/07/2014    UROBILINOGEN 0.2 06/03/2014    NITRU Negative 03/22/2017    NITRU Negative 08/07/2014    NITRU Negative 06/03/2014    LEUKOCYTESUR small 06/19/2017    LEUKOCYTESUR MODERATE 03/22/2017    LEUKOCYTESUR SMALL 08/07/2014    LEUKOCYTESUR + 1 08/07/2014    LEUKOCYTESUR SMALL 06/03/2014       Physical Exam  Vitals and nursing note reviewed. Constitutional:       Appearance: Normal appearance. She is obese. HENT:      Head: Normocephalic and atraumatic. Right Ear: External ear normal.      Left Ear: External ear normal.   Eyes:      Extraocular Movements: Extraocular movements intact. Conjunctiva/sclera: Conjunctivae normal.      Pupils: Pupils are equal, round, and reactive to light. Cardiovascular:      Rate and Rhythm: Normal rate and regular rhythm. Pulses: Normal pulses. Heart sounds: Normal heart sounds. Pulmonary:      Effort: Pulmonary effort is normal.      Breath sounds: Normal breath sounds. Abdominal:      General: Bowel sounds are normal.      Palpations: Abdomen is soft. Musculoskeletal:         General: Tenderness (right knee) present. Normal range of motion. Cervical back: Normal range of motion and neck supple. Skin:     General: Skin is warm and dry. Capillary Refill: Capillary refill takes less than 2 seconds. Neurological:      General: No focal deficit present. Mental Status: She is alert and oriented to person, place, and time. Psychiatric:         Mood and Affect: Mood normal.         Behavior: Behavior normal.         Thought Content: Thought content normal.         ASSESSMENT/PLAN:  Erendira Miller was seen today for new patient and knee pain. Diagnoses and all orders for this visit:    Routine physical examination    Primary hypertension    Acquired hypothyroidism    BMI 60.0-69.9, [facility])       The patient is asked to make an attempt to improve diet and exercise patterns to aid in medical management of this problem. Blood pressure well controlled  Continue current meds  Diet and exercise were discussed and recommended to the patient. Patient declined knee xray, kenalog and toradol injections  Patient to call insurance to see where she should get her shingles vaccinations    Educational materials and/or home exercises printed for patient's review and were included in patient instructions on his/her After Visit Summary and given to patient at the end of visit. Counseled regarding above diagnosis, including possible risks and complications,  especially if left uncontrolled.      Counseled regarding the possible side effects, risks, benefits and alternatives to treatment; patient and/or guardian verbalizes understanding, agrees, feels comfortable with and wishes to proceed with above treatment plan.     Advised patient to call with any new medication issues, and read all Rx info from pharmacy to assure aware of all possible risks and side effects of medication before taking. Reviewed age and gender appropriate health screening exams and vaccinations. Advised patient regarding importance of keeping up with recommended health maintenance and to schedule as soon as possible if overdue, as this is important in assessing for undiagnosed pathology, especially cancer, as well as protecting against potentially harmful/life threatening disease. Patient and/or guardian verbalizes understanding and agrees with above counseling, assessment and plan. All questions answered. Return in about 3 months (around 2/1/2022), or if symptoms worsen or fail to improve, for HTN, hyperlipidemia. An  electronic signature was used to authenticate this note.     --Joshua Valentin DO on 11/1/2021 at 3:38 PM

## 2021-11-09 ENCOUNTER — HOSPITAL ENCOUNTER (OUTPATIENT)
Age: 61
Discharge: HOME | End: 2021-11-09
Payer: MEDICAID

## 2021-11-09 LAB
ANION GAP SERPL CALCULATED.3IONS-SCNC: 17 MMOL/L (ref 7–16)
BUN BLDV-MCNC: 25 MG/DL (ref 6–20)
CALCIUM SERPL-MCNC: 9.3 MG/DL (ref 8.6–10.2)
CHLORIDE BLD-SCNC: 103 MMOL/L (ref 98–107)
CO2: 21 MMOL/L (ref 22–29)
CREAT SERPL-MCNC: 1.6 MG/DL (ref 0.5–1)
GFR AFRICAN AMERICAN: 40
GFR NON-AFRICAN AMERICAN: 33 ML/MIN/1.73
GLUCOSE BLD-MCNC: 103 MG/DL (ref 74–99)
HCT VFR BLD CALC: 46.6 % (ref 34–48)
HEMOGLOBIN: 15.1 G/DL (ref 11.5–15.5)
MCH RBC QN AUTO: 29.5 PG (ref 26–35)
MCHC RBC AUTO-ENTMCNC: 32.4 % (ref 32–34.5)
MCV RBC AUTO: 91.2 FL (ref 80–99.9)
PARATHYROID HORMONE INTACT: 51 PG/ML (ref 15–65)
PDW BLD-RTO: 12.9 FL (ref 11.5–15)
PHOSPHORUS: 3.6 MG/DL (ref 2.5–4.5)
PLATELET # BLD: 164 E9/L (ref 130–450)
PMV BLD AUTO: 11 FL (ref 7–12)
POTASSIUM SERPL-SCNC: 4.5 MMOL/L (ref 3.5–5)
RBC # BLD: 5.11 E12/L (ref 3.5–5.5)
SODIUM BLD-SCNC: 141 MMOL/L (ref 132–146)
WBC # BLD: 6.8 E9/L (ref 4.5–11.5)

## 2021-11-09 PROCEDURE — 83970 ASSAY OF PARATHORMONE: CPT

## 2021-11-09 PROCEDURE — 84100 ASSAY OF PHOSPHORUS: CPT

## 2021-11-09 PROCEDURE — 85027 COMPLETE CBC AUTOMATED: CPT

## 2021-11-09 PROCEDURE — 36415 COLL VENOUS BLD VENIPUNCTURE: CPT

## 2021-11-09 PROCEDURE — 80048 BASIC METABOLIC PNL TOTAL CA: CPT

## 2022-01-21 ENCOUNTER — APPOINTMENT (OUTPATIENT)
Dept: GENERAL RADIOLOGY | Age: 62
End: 2022-01-21
Payer: MEDICAID

## 2022-01-21 ENCOUNTER — HOSPITAL ENCOUNTER (EMERGENCY)
Age: 62
Discharge: HOME | End: 2022-01-21
Payer: MEDICAID

## 2022-01-21 VITALS
WEIGHT: 268 LBS | BODY MASS INDEX: 60.29 KG/M2 | TEMPERATURE: 98.2 F | OXYGEN SATURATION: 96 % | HEART RATE: 88 BPM | HEIGHT: 56 IN | RESPIRATION RATE: 18 BRPM | DIASTOLIC BLOOD PRESSURE: 84 MMHG | SYSTOLIC BLOOD PRESSURE: 128 MMHG

## 2022-01-21 DIAGNOSIS — J04.0 LARYNGITIS: ICD-10-CM

## 2022-01-21 DIAGNOSIS — J02.9 SORE THROAT: ICD-10-CM

## 2022-01-21 DIAGNOSIS — J40 BRONCHITIS: Primary | ICD-10-CM

## 2022-01-21 LAB — STREP GRP A PCR: NEGATIVE

## 2022-01-21 PROCEDURE — 71046 X-RAY EXAM CHEST 2 VIEWS: CPT

## 2022-01-21 PROCEDURE — 99283 EMERGENCY DEPT VISIT LOW MDM: CPT

## 2022-01-21 PROCEDURE — 6370000000 HC RX 637 (ALT 250 FOR IP): Performed by: PHYSICIAN ASSISTANT

## 2022-01-21 PROCEDURE — 87880 STREP A ASSAY W/OPTIC: CPT

## 2022-01-21 RX ORDER — GUAIFENESIN 600 MG/1
600 TABLET, EXTENDED RELEASE ORAL 2 TIMES DAILY
Qty: 30 TABLET | Refills: 0 | Status: SHIPPED | OUTPATIENT
Start: 2022-01-21 | End: 2022-02-05

## 2022-01-21 RX ORDER — DOXYCYCLINE HYCLATE 100 MG/1
100 CAPSULE ORAL ONCE
Status: COMPLETED | OUTPATIENT
Start: 2022-01-21 | End: 2022-01-21

## 2022-01-21 RX ORDER — GUAIFENESIN/DEXTROMETHORPHAN 100-10MG/5
5 SYRUP ORAL ONCE
Status: COMPLETED | OUTPATIENT
Start: 2022-01-21 | End: 2022-01-21

## 2022-01-21 RX ORDER — DOXYCYCLINE HYCLATE 100 MG
100 TABLET ORAL 2 TIMES DAILY
Qty: 20 TABLET | Refills: 0 | Status: SHIPPED | OUTPATIENT
Start: 2022-01-21 | End: 2022-01-31

## 2022-01-21 RX ADMIN — GUAIFENESIN AND DEXTROMETHORPHAN 5 ML: 100; 10 SYRUP ORAL at 21:23

## 2022-01-21 RX ADMIN — DOXYCYCLINE HYCLATE 100 MG: 100 CAPSULE ORAL at 21:23

## 2022-01-22 NOTE — ED PROVIDER NOTES
Independent:    HPI:  1/21/22, Time: 7:36 PM MARA De La Torre is a 63 y.o. female presenting to the ED for sore throat, laryngitis as well as cough, beginning 1 week ago. The complaint has been persistent, moderate in severity. The patient has not had the COVID vaccines, but has had the pneumonia and flu shots. She reports that she has been sick for over the last 1 week with congestion, sore throat and now with loss of her voice. She lives alone, has groceries delivered to her home and rarely goes out as she does not drive. She is unsure of fever, she denies any loss of taste or smell. She reports no severe body aches or chills. She denies any chest pain or SOB. She feels like she has some mucous in her chest.      Review of Systems:   A complete review of systems was performed and pertinent positives and negatives are stated within HPI, all other systems reviewed and are negative.      --------------------------------------------- PAST HISTORY ---------------------------------------------  Past Medical History:  has a past medical history of Anxiety, Arthritis, Carpal tunnel syndrome, Depression, Hyperlipidemia, Hypertension, Hypothyroidism, Kidney stones, Morbid obesity with BMI of 60.0-69.9, adult ([facility] [address] Osteoarthritis, and Thyroid disease. Past Surgical History:  has a past surgical history that includes fracture surgery (Left); Dental surgery; and Hand surgery (Right, 1978). Social History:  reports that she has never smoked. She has never used smokeless tobacco. She reports current alcohol use. She reports that she does not use drugs. Family History: family history includes Allergic Rhinitis in her brother, brother, and sister; Allergies in her brother, brother, and sister; Asthma in her sister; Cancer in her mother and paternal grandmother; Heart Disease (age of onset: 70) in her father; Hypertension in her father; Osteoarthritis in her father; Thyroid Disease in her father.      The patients home medications have been reviewed. Allergies: Seasonal    -------------------------------------------------- RESULTS -------------------------------------------------  All laboratory and radiology results have been personally reviewed by myself   LABS:  Results for orders placed or performed during the hospital encounter of 01/21/22   Strep Screen Group A Throat    Specimen: Throat   Result Value Ref Range    Strep Grp A PCR Negative Negative       RADIOLOGY:  Interpreted by Radiologist.  XR CHEST (2 VW)   Final Result   1. There are faint interstitial opacities in the bilateral mid and lower   lungs. Broad differential which includes infection and edema. Please   correlate with clinical presentation. 2.  Background lung hyperinflation, atherosclerotic disease, and mild   pulmonary vascular congestion      RECOMMENDATION:   (Recommend upright PA and lateral chest radiographs 10-12 weeks after   resolution of patient's symptoms to ensure complete resolution of   radiographic findings. )             ------------------------- NURSING NOTES AND VITALS REVIEWED ---------------------------   The nursing notes within the ED encounter and vital signs as below have been reviewed.    /84   Pulse 88   Temp 98.2 °F (36.8 °C) (Temporal)   Resp 18   Ht 4' 8\" (1.422 m)   Wt 268 lb (121.6 kg)   SpO2 96%   BMI 60.08 kg/m²   Oxygen Saturation Interpretation: Normal      ---------------------------------------------------PHYSICAL EXAM--------------------------------------      Constitutional/General: Alert and oriented x3, stable appearing, non toxic in NAD  Head: Normocephalic and atraumatic  Eyes: PERRL, EOMI  Ears: TMs dull, scant cerumen  Nose: Turbinates swollen, some clear drainage noted  Mouth: Oropharynx clear, mucosa moist, mild erythema, handling secretions, no trismus  Neck: Supple, full ROM, some shotty anterior cervical nodes, no rigidity or stridor  Pulmonary: Lungs with some coarse breath sounds to mid lung fields bilaterally, no obvious wheezes or rhonchi. No obvious area of consolidation. Intermittent cough on exam, no sternal retractions not in respiratory distress  Cardiovascular:  Regular rate and rhythm, no murmurs, gallops, or rubs. 2+ distal pulses  Abdomen: Soft, obese, non tender, non distended,   Extremities: Moves all extremities x 4. No pitting edema warm and well perfused  Skin: warm and dry without rash  Neurologic: GCS 15  Psych: Normal Affect      ------------------------------ ED COURSE/MEDICAL DECISION MAKING----------------------  Medications   doxycycline hyclate (VIBRAMYCIN) capsule 100 mg (100 mg Oral Given 1/21/22 2123)   guaiFENesin-dextromethorphan (ROBITUSSIN DM) 100-10 MG/5ML syrup 5 mL (5 mLs Oral Given 1/21/22 2123)         ED COURSE:       Medical Decision Making:    Patient to ER, persistent cough over the last 1 week or more with sore throat as well as voice hoarseness over the last few days. Patient has not had COVID vaccines. Patient declines COVID test in ER today. Patient would like a strep culture and patient advised I would like to check a chest x-ray and she declined COVID testing. Patient denies any current chest pain, patient pulse ox stable. She denies any history of asthma. Patient is primarily homebound as she does not drive and does have her groceries delivered to the home. Patient advised of negative strep, chest x-ray shows some interstitial changes consistent with possible pneumonia, still have concerns of COVID. Patient having no current complaints of shortness of breath or chest discomfort. Patient still wishes to defer COVID testing, and I told patient that she is out of the window for any type of COVID related treatment. Patient will be covered with oral doxycycline, recommend Mucinex twice daily. Patient advised close follow-up with her PCP. Recommend fluids and rest. She was given her first dose of antibiotic this evening.  Recommend to continue taking antibiotic as prescribed as well as Mucinex twice daily. Recommend if any changes or worsening with increased shortness of breath, high fever, change in her symptoms to call 911 or return to ER immediately. Counseling: The emergency provider has spoken with the patient and discussed todays results, in addition to providing specific details for the plan of care and counseling regarding the diagnosis and prognosis. Questions are answered at this time and they are agreeable with the plan.      --------------------------------- IMPRESSION AND DISPOSITION ---------------------------------    IMPRESSION  1. Bronchitis    2. Laryngitis    3. Sore throat        DISPOSITION  Disposition: Discharge to home  Patient condition is stable      NOTE: This report was transcribed using voice recognition software.  Every effort was made to ensure accuracy; however, inadvertent computerized transcription errors may be present       Tae TIMO Reynolds  01/22/22 8113

## 2022-04-12 DIAGNOSIS — Z76.0 ENCOUNTER FOR MEDICATION REFILL: ICD-10-CM

## 2022-04-12 DIAGNOSIS — E03.9 ACQUIRED HYPOTHYROIDISM: ICD-10-CM

## 2022-04-12 RX ORDER — LEVOTHYROXINE SODIUM 0.07 MG/1
75 TABLET ORAL DAILY
Qty: 28 TABLET | OUTPATIENT
Start: 2022-04-12 | End: 2023-04-12

## 2022-05-18 ENCOUNTER — OFFICE VISIT (OUTPATIENT)
Dept: PRIMARY CARE CLINIC | Age: 62
End: 2022-05-18
Payer: MEDICAID

## 2022-05-18 VITALS
WEIGHT: 277 LBS | SYSTOLIC BLOOD PRESSURE: 124 MMHG | BODY MASS INDEX: 62.31 KG/M2 | HEIGHT: 56 IN | HEART RATE: 60 BPM | DIASTOLIC BLOOD PRESSURE: 82 MMHG | OXYGEN SATURATION: 96 % | TEMPERATURE: 97.6 F

## 2022-05-18 DIAGNOSIS — G89.29 CHRONIC LOW BACK PAIN WITH SCIATICA, SCIATICA LATERALITY UNSPECIFIED, UNSPECIFIED BACK PAIN LATERALITY: ICD-10-CM

## 2022-05-18 DIAGNOSIS — N32.81 OVERACTIVE BLADDER: ICD-10-CM

## 2022-05-18 DIAGNOSIS — F39 MOOD DISORDER (HCC): ICD-10-CM

## 2022-05-18 DIAGNOSIS — E78.2 MIXED HYPERLIPIDEMIA: ICD-10-CM

## 2022-05-18 DIAGNOSIS — J45.20 MILD INTERMITTENT ASTHMA WITHOUT COMPLICATION: ICD-10-CM

## 2022-05-18 DIAGNOSIS — I10 ESSENTIAL HYPERTENSION: Primary | ICD-10-CM

## 2022-05-18 DIAGNOSIS — Z86.39 HISTORY OF VITAMIN D DEFICIENCY: ICD-10-CM

## 2022-05-18 DIAGNOSIS — E03.9 HYPOTHYROIDISM, UNSPECIFIED TYPE: ICD-10-CM

## 2022-05-18 DIAGNOSIS — M54.40 CHRONIC LOW BACK PAIN WITH SCIATICA, SCIATICA LATERALITY UNSPECIFIED, UNSPECIFIED BACK PAIN LATERALITY: ICD-10-CM

## 2022-05-18 DIAGNOSIS — Z76.0 ENCOUNTER FOR MEDICATION REFILL: ICD-10-CM

## 2022-05-18 DIAGNOSIS — E03.9 ACQUIRED HYPOTHYROIDISM: ICD-10-CM

## 2022-05-18 PROCEDURE — 3017F COLORECTAL CA SCREEN DOC REV: CPT | Performed by: FAMILY MEDICINE

## 2022-05-18 PROCEDURE — G8427 DOCREV CUR MEDS BY ELIG CLIN: HCPCS | Performed by: FAMILY MEDICINE

## 2022-05-18 PROCEDURE — G8417 CALC BMI ABV UP PARAM F/U: HCPCS | Performed by: FAMILY MEDICINE

## 2022-05-18 PROCEDURE — 99214 OFFICE O/P EST MOD 30 MIN: CPT | Performed by: FAMILY MEDICINE

## 2022-05-18 PROCEDURE — 1036F TOBACCO NON-USER: CPT | Performed by: FAMILY MEDICINE

## 2022-05-18 RX ORDER — LEVOTHYROXINE SODIUM 0.07 MG/1
75 TABLET ORAL DAILY
Qty: 30 TABLET | Refills: 2 | Status: SHIPPED
Start: 2022-05-18 | End: 2022-09-29

## 2022-05-18 RX ORDER — ALBUTEROL SULFATE 90 UG/1
2 AEROSOL, METERED RESPIRATORY (INHALATION) 4 TIMES DAILY PRN
Qty: 54 G | Refills: 0 | Status: SHIPPED | OUTPATIENT
Start: 2022-05-18

## 2022-05-18 SDOH — ECONOMIC STABILITY: FOOD INSECURITY: WITHIN THE PAST 12 MONTHS, YOU WORRIED THAT YOUR FOOD WOULD RUN OUT BEFORE YOU GOT MONEY TO BUY MORE.: NEVER TRUE

## 2022-05-18 SDOH — ECONOMIC STABILITY: FOOD INSECURITY: WITHIN THE PAST 12 MONTHS, THE FOOD YOU BOUGHT JUST DIDN'T LAST AND YOU DIDN'T HAVE MONEY TO GET MORE.: NEVER TRUE

## 2022-05-18 ASSESSMENT — PATIENT HEALTH QUESTIONNAIRE - PHQ9
SUM OF ALL RESPONSES TO PHQ9 QUESTIONS 1 & 2: 0
3. TROUBLE FALLING OR STAYING ASLEEP: 0
SUM OF ALL RESPONSES TO PHQ QUESTIONS 1-9: 0
1. LITTLE INTEREST OR PLEASURE IN DOING THINGS: 0
SUM OF ALL RESPONSES TO PHQ QUESTIONS 1-9: 0
10. IF YOU CHECKED OFF ANY PROBLEMS, HOW DIFFICULT HAVE THESE PROBLEMS MADE IT FOR YOU TO DO YOUR WORK, TAKE CARE OF THINGS AT HOME, OR GET ALONG WITH OTHER PEOPLE: 0
7. TROUBLE CONCENTRATING ON THINGS, SUCH AS READING THE NEWSPAPER OR WATCHING TELEVISION: 0
9. THOUGHTS THAT YOU WOULD BE BETTER OFF DEAD, OR OF HURTING YOURSELF: 0
SUM OF ALL RESPONSES TO PHQ QUESTIONS 1-9: 0
6. FEELING BAD ABOUT YOURSELF - OR THAT YOU ARE A FAILURE OR HAVE LET YOURSELF OR YOUR FAMILY DOWN: 0
5. POOR APPETITE OR OVEREATING: 0
4. FEELING TIRED OR HAVING LITTLE ENERGY: 0
SUM OF ALL RESPONSES TO PHQ QUESTIONS 1-9: 0
2. FEELING DOWN, DEPRESSED OR HOPELESS: 0
8. MOVING OR SPEAKING SO SLOWLY THAT OTHER PEOPLE COULD HAVE NOTICED. OR THE OPPOSITE, BEING SO FIGETY OR RESTLESS THAT YOU HAVE BEEN MOVING AROUND A LOT MORE THAN USUAL: 0

## 2022-05-18 ASSESSMENT — SOCIAL DETERMINANTS OF HEALTH (SDOH): HOW HARD IS IT FOR YOU TO PAY FOR THE VERY BASICS LIKE FOOD, HOUSING, MEDICAL CARE, AND HEATING?: NOT VERY HARD

## 2022-05-18 NOTE — PATIENT INSTRUCTIONS
Patient Education        Sciatica: ExercisesPatient Education        Acute Low Back Pain: Exercises  Introduction  Here are some examples of typical rehabilitation exercises for your condition. Start each exercise slowly. Ease off the exercise if you start to have pain. Your doctor or physical therapist will tell you when you can start theseexercises and which ones will work best for you. When you are not being active, find a comfortable position for rest. Some people are comfortable on the floor or a medium-firm bed with a small pillow under their head and another under their knees. Some people prefer to lie on their side with a pillow between their knees. Don't stay in one position fortoo long. Take short walks (10 to 20 minutes) every 2 to 3 hours. Avoid slopes, hills, and stairs until you feel better. Walk only distances you can manage withoutpain, especially leg pain. How to do the exercises  Back stretches    1. Get down on your hands and knees on the floor. 2. Relax your head and allow it to droop. Round your back up toward the ceiling until you feel a nice stretch in your upper, middle, and lower back. Hold this stretch for as long as it feels comfortable, or about 15 to 30 seconds. 3. Return to the starting position with a flat back while you are on your hands and knees. 4. Let your back sway by pressing your stomach toward the floor. Lift your buttocks toward the ceiling. 5. Hold this position for 15 to 30 seconds. 6. Repeat 2 to 4 times. Follow-up care is a key part of your treatment and safety. Be sure to make and go to all appointments, and call your doctor if you are having problems. It's also a good idea to know your test results and keep alist of the medicines you take. Where can you learn more? Go to https://carmen.[employer]. org and sign in to your [employer] account. Enter C290 in the NewsBreak box to learn more about \"Acute Low Back Pain: Exercises. \"     If you do not have an account, please click on the \"Sign Up Now\" link. Current as of: September 8, 2021               Content Version: 13.2  © 2006-2022 Healthwise, [employer]. Care instructions adapted under license by [facility] ([facility]). If you have questions about a medical condition or this instruction, always ask your healthcare professional. [address] any warranty or liability for your use of this information. Introduction  Here are some examples of typical rehabilitation exercises for your condition. Start each exercise slowly. Ease off the exercise if you start to have pain. Your doctor or physical therapist will tell you when you can start theseexercises and which ones will work best for you. When you are not being active, find a comfortable position for rest. Some people are comfortable on the floor or a medium-firm bed with a small pillow under their head and another under their knees. Some people prefer to lie on their side with a pillow between their knees. Don't stay in one position fortoo long. Take short walks (10 to 20 minutes) every 2 to 3 hours. Avoid slopes, hills, and stairs until you feel better. Walk only distances you can manage withoutpain, especially leg pain. How to do the exercises  Back stretches    1. Get down on your hands and knees on the floor. 2. Relax your head and allow it to droop. Round your back up toward the ceiling until you feel a nice stretch in your upper, middle, and lower back. Hold this stretch for as long as it feels comfortable, or about 15 to 30 seconds. 3. Return to the starting position with a flat back while you are on your hands and knees. 4. Let your back sway by pressing your stomach toward the floor. Lift your buttocks toward the ceiling. 5. Hold this position for 15 to 30 seconds. 6. Repeat 2 to 4 times. Follow-up care is a key part of your treatment and safety.  Be sure to make and go to all appointments, and call your doctor if you are having problems. It's also a good idea to know your test results and keep alist of the medicines you take. Where can you learn more? Go to https://[employer] Limitedpepiceweb.Pepperdata. org and sign in to your [employer] account. Enter I429 in the [facility] box to learn more about \"Sciatica: Exercises. \"     If you do not have an account, please click on the \"Sign Up Now\" link. Current as of: July 1, 2021               Content Version: 13.2  © 0887-3024 Healthwise, Incorporated. Care instructions adapted under license by [facility] ([facility]). If you have questions about a medical condition or this instruction, always ask your healthcare professional. [address] any warranty or liability for your use of this information.

## 2022-05-18 NOTE — PROGRESS NOTES
[facility]        SUBJECTIVE:  CC: had concerns including New Patient, Hypertension, Asthma, and Back Pain. Jose G Pollard presented to the clinic to establish with a new provider. She reports doing ok. She notes having chronic back pain. She sees [facility] for her mental health. She denies any s/h ideations. Review of Systems   Constitutional: Negative for appetite change, fatigue and fever. Respiratory: Negative for cough, shortness of breath and wheezing. Cardiovascular: Negative for chest pain and palpitations. Gastrointestinal: Negative for abdominal pain, constipation, diarrhea, nausea and vomiting. Musculoskeletal: Positive for back pain. Negative for gait problem. Outpatient Medications Marked as Taking for the 5/18/22 encounter (Office Visit) with Payton Sandoval MD   Medication Sig Dispense Refill    levothyroxine (SYNTHROID) 75 MCG tablet Take 1 tablet by mouth Daily 30 tablet 2    albuterol sulfate HFA (VENTOLIN HFA) 108 (90 Base) MCG/ACT inhaler Inhale 2 puffs into the lungs 4 times daily as needed for Wheezing 54 g 0    metoprolol tartrate (LOPRESSOR) 25 MG tablet Take 1 tablet by mouth daily Indications: High Blood Pressure Disorder 30 tablet 11    simvastatin (ZOCOR) 40 MG tablet Take 1 tablet by mouth nightly 30 tablet 11    vitamin D3 (CHOLECALCIFEROL) 25 MCG (1000 UT) TABS tablet Take 1 tablet by mouth daily 30 tablet 11    oxybutynin (DITROPAN-XL) 5 MG extended release tablet 5 mg daily       ARIPiprazole (ABILIFY) 5 MG tablet Take 1 tablet by mouth daily 30 tablet 5    buPROPion (WELLBUTRIN XL) 300 MG extended release tablet TAKE 1 TABLET BY MOUTH EVERY MORNING 30 tablet 5    Misc.  Devices (CRUTCH-MATE ADULT HAND  LG) MISC Patient is morbidly obese and needs assistance in getting dressed 1 each 0       Past Medical History:   Diagnosis Date    Anxiety     Arthritis     Carpal tunnel syndrome     Depression     Hyperlipidemia     Hypertension     Hypothyroidism     Kidney stones     Morbid obesity with BMI of 60.0-69.9, adult (HCC)     Osteoarthritis     Thyroid disease        Past Surgical History:   Procedure Laterality Date    DENTAL SURGERY      FRACTURE SURGERY Left     Wrist, Dr. Branden Santoyo HAND       Family History   Problem Relation Age of Onset    Cancer Mother         Cervical    Cancer Paternal Grandmother         Breast    Asthma Sister     Osteoarthritis Father     Hypertension Father     Heart Disease Father 70        SCD    Thyroid Disease Father     Allergic Rhinitis Brother     Allergies Brother     Allergic Rhinitis Sister     Allergies Sister     Allergic Rhinitis Brother     Allergies Brother        Allergies   Allergen Reactions    Seasonal      Itchy watery eyes, congestion       Social History:  TOBACCO:   reports that she has never smoked. She has never used smokeless tobacco.  ETOH:   reports current alcohol use. OBJECTIVE    VS: /82   Pulse 60   Temp 97.6 °F (36.4 °C)   Ht 4' 8\" (1.422 m)   Wt 277 lb (125.6 kg)   SpO2 96%   BMI 62.10 kg/m²   Physical Exam  Constitutional:       General: She is not in acute distress. Appearance: She is well-developed. She is obese. She is not diaphoretic. HENT:      Head: Normocephalic and atraumatic. Eyes:      Conjunctiva/sclera: Conjunctivae normal.      Pupils: Pupils are equal, round, and reactive to light. Cardiovascular:      Rate and Rhythm: Normal rate and regular rhythm. Pulmonary:      Effort: Pulmonary effort is normal.      Breath sounds: Normal breath sounds. Abdominal:      General: Bowel sounds are normal. There is no distension. Palpations: Abdomen is soft. Tenderness: There is no abdominal tenderness. Hernia: No hernia is present. Musculoskeletal:      Cervical back: Normal range of motion and neck supple.    Skin:     General: Skin is warm and dry.   Neurological:      Mental Status: She is alert and oriented to person, place, and time. ASSESSMENT/PLAN:  1. Essential hypertension  - CBC; Future  - Comprehensive Metabolic Panel; Future  - [facility] Referral to Social Work (Primary Care Only)    2. Hypothyroidism, unspecified type  - TSH; Future    3. Mood disorder ([address]  Continue to follow with Psychiatry for mental health. No s/h ideations. 4. Mixed hyperlipidemia  - Lipid Panel; Future    5. Overactive bladder    6. Encounter for medication refill  - levothyroxine (SYNTHROID) 75 MCG tablet; Take 1 tablet by mouth Daily  Dispense: 30 tablet; Refill: 2    7. Acquired hypothyroidism  - levothyroxine (SYNTHROID) 75 MCG tablet; Take 1 tablet by mouth Daily  Dispense: 30 tablet; Refill: 2    8. Mild intermittent asthma without complication  - albuterol sulfate HFA (VENTOLIN HFA) 108 (90 Base) MCG/ACT inhaler; Inhale 2 puffs into the lungs 4 times daily as needed for Wheezing  Dispense: 54 g; Refill: 0    9. Chronic low back pain with sciatica, sciatica laterality unspecified, unspecified back pain laterality  - XR LUMBAR SPINE (MIN 4 VIEWS); Future  - [facility] Referral to Social Work (Primary Care Only)  - XR SACRUM COCCYX (MIN 2 VIEWS); Future    10. History of vitamin D deficiency  - Vitamin D 25 Hydroxy; Future      I have reviewed my findings and recommendations with Scott Fields MD  5/19/2022 1:52 PM    Counseled regarding above diagnosis, including possible risks and complications, especially if left uncontrolled. Discussed medications risk/benefits and possible side effects and alternatives to treatment. Patient and/or guardian verbalizes understanding, agrees, feels comfortable with and wishes to proceed with above treatment plan.       Advised patient regarding importance of keeping up with recommended health maintenance and to schedule as soon as possible if overdue, as this is important in assessing for undiagnosed pathology, especially cancer, as well as protecting against potentially harmful/life threatening disease. Patient and/or guardian verbalizes understanding and agrees with above counseling, assessment and plan. All questions answered. Please note this report has been partially produced using speech recognition software  and may contain errors related to that system including grammar, punctuation and spelling as well as words and phrases that may seem inappropriate. If there are questions or concerns please feel free to contact me to clarify.

## 2022-05-20 ENCOUNTER — CARE COORDINATION (OUTPATIENT)
Dept: CARE COORDINATION | Age: 62
End: 2022-05-20

## 2022-05-20 NOTE — CARE COORDINATION
Kristine Reynolds attempted to contact Erin Rice for PCP referral regarding transportation concerns with her JOE. There was no answer. [facility] began to leave a voice message but then the phone disconnected.       [facility] to follow accordingly

## 2022-05-23 ENCOUNTER — CARE COORDINATION (OUTPATIENT)
Dept: CARE COORDINATION | Age: 62
End: 2022-05-23

## 2022-05-23 NOTE — CARE COORDINATION
[facility] made a 2nd outreach attempt to [employer]. There was no answer.  left with [facility] information and request for return call.     [facility] will continue to follow

## 2022-05-26 ASSESSMENT — ENCOUNTER SYMPTOMS
CONSTIPATION: 0
VOMITING: 0
SHORTNESS OF BREATH: 0
NAUSEA: 0
BACK PAIN: 1
DIARRHEA: 0
WHEEZING: 0
ABDOMINAL PAIN: 0
COUGH: 0

## 2022-05-27 DIAGNOSIS — E78.2 MIXED HYPERLIPIDEMIA: ICD-10-CM

## 2022-05-27 DIAGNOSIS — Z76.0 ENCOUNTER FOR MEDICATION REFILL: ICD-10-CM

## 2022-05-30 RX ORDER — SIMVASTATIN 40 MG
TABLET ORAL
Qty: 30 TABLET | Refills: 2 | Status: SHIPPED | OUTPATIENT
Start: 2022-05-30

## 2022-06-07 ENCOUNTER — CARE COORDINATION (OUTPATIENT)
Dept: CARE COORDINATION | Age: 62
End: 2022-06-07

## 2022-06-07 NOTE — CARE COORDINATION
[facility] made a 3rd outreach attempt to [employer]. There was no answer and the message stated the caller was \"not available\"  There was no ability for [facility] to leave a voice message requesting a call back. [facility] will update PCP on the 3 attempts to contact with no success.   [facility] will close referral today

## 2022-09-02 DIAGNOSIS — I10 ESSENTIAL HYPERTENSION: ICD-10-CM

## 2022-09-02 DIAGNOSIS — E53.8 VITAMIN B12 DEFICIENCY: ICD-10-CM

## 2022-09-02 DIAGNOSIS — Z76.0 ENCOUNTER FOR MEDICATION REFILL: ICD-10-CM

## 2022-09-02 RX ORDER — CHOLECALCIFEROL (VITAMIN D3) 125 MCG
CAPSULE ORAL
Qty: 28 TABLET | OUTPATIENT
Start: 2022-09-02

## 2022-09-29 ENCOUNTER — TELEPHONE (OUTPATIENT)
Dept: PRIMARY CARE CLINIC | Age: 62
End: 2022-09-29

## 2022-09-29 RX ORDER — LEVOTHYROXINE SODIUM 75 MCG
75 TABLET ORAL DAILY
Qty: 30 TABLET | Refills: 0
Start: 2022-09-29 | End: 2022-11-13

## 2022-09-29 NOTE — TELEPHONE ENCOUNTER
Pt called into [facility], asked \"I want to know why Dr Aditya Gonzalez is not refilling my Thyroid Medicine?, I have been out of it for over 1 month now\" , informed pt that message will be forwarded to clinical staff and she will receive a return call.       .Electronically signed by Alejandra Castillo on 9/29/2022 at 3:33 PM

## 2022-10-27 ENCOUNTER — HOSPITAL ENCOUNTER (OUTPATIENT)
Age: 62
Discharge: HOME | End: 2022-10-27
Payer: MEDICAID

## 2022-10-27 ENCOUNTER — HOSPITAL ENCOUNTER (OUTPATIENT)
Age: 62
Discharge: HOME | End: 2022-10-29
Payer: MEDICAID

## 2022-10-27 ENCOUNTER — HOSPITAL ENCOUNTER (OUTPATIENT)
Dept: GENERAL RADIOLOGY | Age: 62
Discharge: HOME | End: 2022-10-29
Payer: MEDICAID

## 2022-10-27 DIAGNOSIS — M54.40 CHRONIC LOW BACK PAIN WITH SCIATICA, SCIATICA LATERALITY UNSPECIFIED, UNSPECIFIED BACK PAIN LATERALITY: ICD-10-CM

## 2022-10-27 DIAGNOSIS — I10 ESSENTIAL HYPERTENSION: ICD-10-CM

## 2022-10-27 DIAGNOSIS — E78.2 MIXED HYPERLIPIDEMIA: ICD-10-CM

## 2022-10-27 DIAGNOSIS — G89.29 CHRONIC LOW BACK PAIN WITH SCIATICA, SCIATICA LATERALITY UNSPECIFIED, UNSPECIFIED BACK PAIN LATERALITY: ICD-10-CM

## 2022-10-27 DIAGNOSIS — E03.9 HYPOTHYROIDISM, UNSPECIFIED TYPE: ICD-10-CM

## 2022-10-27 DIAGNOSIS — Z86.39 HISTORY OF VITAMIN D DEFICIENCY: ICD-10-CM

## 2022-10-27 LAB
ALBUMIN SERPL-MCNC: 4.1 G/DL (ref 3.5–5.2)
ALP BLD-CCNC: 98 U/L (ref 35–104)
ALT SERPL-CCNC: 9 U/L (ref 0–32)
ANION GAP SERPL CALCULATED.3IONS-SCNC: 15 MMOL/L (ref 7–16)
AST SERPL-CCNC: 14 U/L (ref 0–31)
BILIRUB SERPL-MCNC: 0.9 MG/DL (ref 0–1.2)
BUN BLDV-MCNC: 11 MG/DL (ref 6–23)
CALCIUM SERPL-MCNC: 9.6 MG/DL (ref 8.6–10.2)
CHLORIDE BLD-SCNC: 101 MMOL/L (ref 98–107)
CHOLESTEROL, TOTAL: 164 MG/DL (ref 0–199)
CO2: 22 MMOL/L (ref 22–29)
CREAT SERPL-MCNC: 1.6 MG/DL (ref 0.5–1)
GFR SERPL CREATININE-BSD FRML MDRD: 37 ML/MIN/1.73
GLUCOSE BLD-MCNC: 103 MG/DL (ref 74–99)
HBA1C MFR BLD: 5.3 % (ref 4–5.6)
HCT VFR BLD CALC: 46 % (ref 34–48)
HDLC SERPL-MCNC: 51 MG/DL
HEMOGLOBIN: 15.1 G/DL (ref 11.5–15.5)
LDL CHOLESTEROL CALCULATED: 90 MG/DL (ref 0–99)
MCH RBC QN AUTO: 30.3 PG (ref 26–35)
MCHC RBC AUTO-ENTMCNC: 32.8 % (ref 32–34.5)
MCV RBC AUTO: 92.2 FL (ref 80–99.9)
PDW BLD-RTO: 13.9 FL (ref 11.5–15)
PLATELET # BLD: 234 E9/L (ref 130–450)
PMV BLD AUTO: 10.2 FL (ref 7–12)
POTASSIUM SERPL-SCNC: 4 MMOL/L (ref 3.5–5)
RBC # BLD: 4.99 E12/L (ref 3.5–5.5)
SODIUM BLD-SCNC: 138 MMOL/L (ref 132–146)
T3 TOTAL: 96.33 NG/DL (ref 80–200)
T4 FREE: 0.85 NG/DL (ref 0.93–1.7)
TOTAL PROTEIN: 7.7 G/DL (ref 6.4–8.3)
TRIGL SERPL-MCNC: 115 MG/DL (ref 0–149)
TSH SERPL DL<=0.05 MIU/L-ACNC: 6.49 UIU/ML (ref 0.27–4.2)
VITAMIN D 25-HYDROXY: 59 NG/ML (ref 30–100)
VLDLC SERPL CALC-MCNC: 23 MG/DL
WBC # BLD: 6.3 E9/L (ref 4.5–11.5)

## 2022-10-27 PROCEDURE — 36415 COLL VENOUS BLD VENIPUNCTURE: CPT

## 2022-10-27 PROCEDURE — 85027 COMPLETE CBC AUTOMATED: CPT

## 2022-10-27 PROCEDURE — 80061 LIPID PANEL: CPT

## 2022-10-27 PROCEDURE — 84443 ASSAY THYROID STIM HORMONE: CPT

## 2022-10-27 PROCEDURE — 83036 HEMOGLOBIN GLYCOSYLATED A1C: CPT

## 2022-10-27 PROCEDURE — 80053 COMPREHEN METABOLIC PANEL: CPT

## 2022-10-27 PROCEDURE — 72220 X-RAY EXAM SACRUM TAILBONE: CPT

## 2022-10-27 PROCEDURE — 84439 ASSAY OF FREE THYROXINE: CPT

## 2022-10-27 PROCEDURE — 82306 VITAMIN D 25 HYDROXY: CPT

## 2022-10-27 PROCEDURE — 72110 X-RAY EXAM L-2 SPINE 4/>VWS: CPT

## 2022-10-27 PROCEDURE — 84480 ASSAY TRIIODOTHYRONINE (T3): CPT

## 2022-11-13 DIAGNOSIS — Z76.0 ENCOUNTER FOR MEDICATION REFILL: ICD-10-CM

## 2022-11-13 DIAGNOSIS — I10 ESSENTIAL HYPERTENSION: ICD-10-CM

## 2022-11-13 RX ORDER — LEVOTHYROXINE SODIUM 0.05 MG/1
50 TABLET ORAL DAILY
Qty: 30 TABLET | Refills: 0 | Status: SHIPPED | OUTPATIENT
Start: 2022-11-13

## 2022-11-21 ENCOUNTER — OFFICE VISIT (OUTPATIENT)
Dept: PRIMARY CARE CLINIC | Age: 62
End: 2022-11-21

## 2022-11-21 VITALS
SYSTOLIC BLOOD PRESSURE: 130 MMHG | RESPIRATION RATE: 18 BRPM | TEMPERATURE: 97.1 F | OXYGEN SATURATION: 98 % | HEIGHT: 56 IN | BODY MASS INDEX: 62.76 KG/M2 | WEIGHT: 279 LBS | DIASTOLIC BLOOD PRESSURE: 84 MMHG | HEART RATE: 64 BPM

## 2022-11-21 DIAGNOSIS — E03.9 HYPOTHYROIDISM, UNSPECIFIED TYPE: ICD-10-CM

## 2022-11-21 DIAGNOSIS — E78.2 MIXED HYPERLIPIDEMIA: ICD-10-CM

## 2022-11-21 DIAGNOSIS — J98.01 BRONCHOSPASM: ICD-10-CM

## 2022-11-21 DIAGNOSIS — I10 ESSENTIAL HYPERTENSION: ICD-10-CM

## 2022-11-21 DIAGNOSIS — Z23 FLU VACCINE NEED: ICD-10-CM

## 2022-11-21 DIAGNOSIS — N18.32 STAGE 3B CHRONIC KIDNEY DISEASE (HCC): ICD-10-CM

## 2022-11-21 DIAGNOSIS — M51.36 DDD (DEGENERATIVE DISC DISEASE), LUMBAR: Primary | ICD-10-CM

## 2022-11-21 RX ORDER — SIMVASTATIN 40 MG
TABLET ORAL
Qty: 30 TABLET | Refills: 5 | Status: SHIPPED | OUTPATIENT
Start: 2022-11-21

## 2022-11-21 RX ORDER — ALBUTEROL SULFATE 90 UG/1
2 AEROSOL, METERED RESPIRATORY (INHALATION) 3 TIMES DAILY PRN
Qty: 54 G | Refills: 1 | Status: SHIPPED | OUTPATIENT
Start: 2022-11-21

## 2022-11-21 RX ORDER — METHYLPREDNISOLONE 4 MG/1
TABLET ORAL
Qty: 1 KIT | Refills: 0 | Status: SHIPPED | OUTPATIENT
Start: 2022-11-21 | End: 2022-11-27

## 2022-11-21 RX ORDER — ARIPIPRAZOLE 10 MG/1
TABLET ORAL
COMMUNITY
Start: 2022-11-04

## 2022-11-21 ASSESSMENT — ENCOUNTER SYMPTOMS
WHEEZING: 0
BACK PAIN: 1
NAUSEA: 0
ABDOMINAL PAIN: 0
COUGH: 0
SHORTNESS OF BREATH: 0
CONSTIPATION: 0
VOMITING: 0
DIARRHEA: 0

## 2022-11-21 NOTE — PROGRESS NOTES
[facility]  Family Medicine Outpatient        SUBJECTIVE:  CC: had concerns including Hypertension. HPI:  Renee Colbert is a female 63 y.o. presented to the clinic for an established visit. She was last seen in May to establish as a new patient. She had labs done in October that showed a subtherapeutic thyroid. The patient admitted that she hadn't been taking her thyroid medications in a couple months. She was reinitiated on 50 mcg/qd. She reports her DDD has gotten worse. Her right side hurts and she has to lay on her left side. She denies any shooting down her legs. She has been using generic Tylenol. She denies any paresthesia of her low back or urinary/stool incontinence. Review of Systems   Constitutional:  Negative for appetite change, fatigue and fever. Respiratory:  Negative for cough, shortness of breath and wheezing. Cardiovascular:  Negative for chest pain and palpitations. Gastrointestinal:  Negative for abdominal pain, constipation, diarrhea, nausea and vomiting. Musculoskeletal:  Positive for back pain. Negative for myalgias. Outpatient Medications Marked as Taking for the 22 encounter (Office Visit) with Anusha Wheatley MD   Medication Sig Dispense Refill    simvastatin (ZOCOR) 40 MG tablet TAKE 1 TABLET BY MOUTH EVERY NIGHT 30 tablet 5    [] methylPREDNISolone (MEDROL DOSEPACK) 4 MG tablet Take by mouth.  1 kit 0    albuterol sulfate HFA (VENTOLIN HFA) 108 (90 Base) MCG/ACT inhaler Inhale 2 puffs into the lungs 3 times daily as needed for Wheezing or Shortness of Breath 54 g 1    levothyroxine (SYNTHROID) 50 MCG tablet Take 1 tablet by mouth Daily 30 tablet 0    metoprolol tartrate (LOPRESSOR) 25 MG tablet Take 1 tablet by mouth daily Indications: High Blood Pressure Disorder 30 tablet 0    albuterol sulfate HFA (VENTOLIN HFA) 108 (90 Base) MCG/ACT inhaler Inhale 2 puffs into the lungs 4 times daily as needed for Wheezing 54 g 0    oxybutynin (DITROPAN-XL) 5 MG extended release tablet 5 mg daily       buPROPion (WELLBUTRIN XL) 300 MG extended release tablet TAKE 1 TABLET BY MOUTH EVERY MORNING 30 tablet 5    Misc. Devices (CRUTCH-MATE ADULT HAND  LG) MISC Patient is morbidly obese and needs assistance in getting dressed 1 each 0       I have reviewed all pertinent PMHx, PSHx, FamHx, SocialHx, medications, and allergies and updated history as appropriate. OBJECTIVE    VS: /84   Pulse 64   Temp 97.1 °F (36.2 °C) (Oral)   Resp 18   Ht 4' 8\" (1.422 m)   Wt 279 lb (126.6 kg)   SpO2 98%   BMI 62.55 kg/m²   Physical Exam  Constitutional:       General: She is not in acute distress. Appearance: She is well-developed. She is obese. She is not diaphoretic. HENT:      Head: Normocephalic and atraumatic. Eyes:      Conjunctiva/sclera: Conjunctivae normal.      Pupils: Pupils are equal, round, and reactive to light. Cardiovascular:      Rate and Rhythm: Normal rate and regular rhythm. Pulmonary:      Effort: Pulmonary effort is normal.      Breath sounds: Normal breath sounds. Abdominal:      General: Bowel sounds are normal. There is no distension. Palpations: Abdomen is soft. Tenderness: There is no abdominal tenderness. Hernia: No hernia is present. Musculoskeletal:      Cervical back: Normal range of motion and neck supple. Skin:     General: Skin is warm and dry. Neurological:      Mental Status: She is alert and oriented to person, place, and time. ASSESSMENT/PLAN:  1. DDD (degenerative disc disease), lumbar  - methylPREDNISolone (MEDROL DOSEPACK) 4 MG tablet; Take by mouth. Dispense: 1 kit; Refill: 0  - DME Order for (Specify) as OP    2. Hypothyroidism, unspecified type  - TSH; Future  - T4, Free; Future    3. Essential hypertension    4. Mixed hyperlipidemia  - simvastatin (ZOCOR) 40 MG tablet; TAKE 1 TABLET BY MOUTH EVERY NIGHT  Dispense: 30 tablet; Refill: 5    5. Stage 3b chronic kidney disease ([address]    6.  Flu vaccine need  - Influenza, FLUCELVAX, (age 10 mo+), IM, Preservative Free, 0.5 mL    7. Bronchospasm  5+ years albuterol. < 2 x a week. With cold air has episodic wheeze. Nonsmoker. - albuterol sulfate HFA (VENTOLIN HFA) 108 (90 Base) MCG/ACT inhaler; Inhale 2 puffs into the lungs 3 times daily as needed for Wheezing or Shortness of Breath  Dispense: 54 g; Refill: 1        I have reviewed my findings and recommendations with Eloy Crouch MD  12/4/2022 9:54 PM  Return in about 3 months (around 2/21/2023) for pap, established f/u 30 MIN! Britneyjovani Mejiaantony Counseled regarding above diagnosis, including possible risks and complications, especially if left uncontrolled. Patient counseled on red flag symptoms and if they occur to go to the ED. Discussed medications risk/benefits and possible side effects and alternatives to treatment. Patient and/or guardian verbalizes understanding, agrees, feels comfortable with and wishes to proceed with above treatment plan. Advised patient regarding importance of keeping up with recommended health maintenance and to schedule as soon as possible if overdue, as this is important in assessing for undiagnosed pathology, especially cancer, as well as protecting against potentially harmful/life threatening disease. Patient and/or guardian verbalizes understanding and agrees with above counseling, assessment and plan. All questions answered. Please note this report has been partially produced using speech recognition software  and may contain errors related to that system including grammar, punctuation and spelling as well as words and phrases that may seem inappropriate. If there are questions or concerns please feel free to contact me to clarify.

## 2022-12-07 ENCOUNTER — TELEPHONE (OUTPATIENT)
Dept: PRIMARY CARE CLINIC | Age: 62
End: 2022-12-07

## 2022-12-07 NOTE — TELEPHONE ENCOUNTER
Patient called stating she would like a order placed for an X-ray of her back. Stated she is experiencing extreme muscle & back pain, also stated the medication Dr. Snehal Stanford prescribed has not provided any relief. Notified her I would route the conversation to  No further questions.

## 2022-12-08 NOTE — TELEPHONE ENCOUNTER
Patient had xrays at the end of October of her lumbar and sacrum. If patient is worsening she needs to be reevaluated in the Urgent Care or ED.

## 2022-12-13 NOTE — TELEPHONE ENCOUNTER
----- Message from Luis Amor sent at 12/12/2022  3:01 PM EST -----  Subject: Refill Request    QUESTIONS  Name of Medication? levothyroxine (SYNTHROID) 50 MCG tablet  Patient-reported dosage and instructions? 50 MCG tablet  How many days do you have left? 4  Preferred Pharmacy? [id number]  [place] phone number (if available)? [phone]  ---------------------------------------------------------------------------  --------------,  Name of Medication? metoprolol tartrate (LOPRESSOR) 25 MG tablet  Patient-reported dosage and instructions? 25 MG tablet  How many days do you have left? 5  Preferred Pharmacy? [id number]  [place] phone number (if available)? [phone]  ---------------------------------------------------------------------------  --------------  CALL BACK INFO  What is the best way for the office to contact you? Do not leave any   message, patient will call back for answer  Preferred Call Back Phone Number? [phone]  ---------------------------------------------------------------------------  --------------  SCRIPT ANSWERS  Relationship to Patient?  Self

## 2022-12-14 RX ORDER — LEVOTHYROXINE SODIUM 0.05 MG/1
50 TABLET ORAL DAILY
Qty: 30 TABLET | Refills: 0 | Status: SHIPPED | OUTPATIENT
Start: 2022-12-14

## 2022-12-16 NOTE — TELEPHONE ENCOUNTER
Attempted to reach patient but  states \"the wireless customer you are trying to reach is unavailable\" and disconnected the call. Chart message sent to patient.

## 2022-12-16 NOTE — TELEPHONE ENCOUNTER
Patient called back and notified of message. She states her pain has since improved. I also confirmed her Synthroid was sent into [facility]. Patient verbalized understanding.

## 2022-12-20 DIAGNOSIS — Z76.0 ENCOUNTER FOR MEDICATION REFILL: ICD-10-CM

## 2022-12-20 DIAGNOSIS — I10 ESSENTIAL HYPERTENSION: ICD-10-CM

## 2022-12-20 NOTE — TELEPHONE ENCOUNTER
Pt's pharmacy called for refill. Last seen 11/21/2022  Next appt 2/13/2023    Rx pended. Please review and sign.     Electronically signed by Jessy Sin MA on 12/20/22 at 9:35 AM EST